# Patient Record
Sex: FEMALE | Race: WHITE | Employment: OTHER | URBAN - METROPOLITAN AREA
[De-identification: names, ages, dates, MRNs, and addresses within clinical notes are randomized per-mention and may not be internally consistent; named-entity substitution may affect disease eponyms.]

---

## 2019-08-23 ENCOUNTER — HOSPITAL ENCOUNTER (OUTPATIENT)
Dept: PHYSICAL THERAPY | Age: 74
Discharge: HOME | End: 2019-08-23

## 2019-08-23 NOTE — PROGRESS NOTES
PT NOTE: Patient cancelled her evaluation this morning (<24 hours notice), due to son not being able to drive her to appointment this morning.   Anh River, PT

## 2019-09-13 ENCOUNTER — HOSPITAL ENCOUNTER (OUTPATIENT)
Dept: PHYSICAL THERAPY | Age: 74
Discharge: HOME | End: 2019-09-13
Payer: COMMERCIAL

## 2019-09-13 PROCEDURE — 97162 PT EVAL MOD COMPLEX 30 MIN: CPT

## 2019-09-13 NOTE — THERAPY EVALUATION
Sudheer Gregg  : 1945  Primary: Radha Burn Of aCrolyne Vincent*  Secondary:  [facility] [place] [id number] [facility], [address] [place] [address],[address], [address]  [phone]   [phone]           OUTPATIENT PHYSICAL THERAPY:Initial Assessment 2019   ICD-10: Treatment Diagnosis: Other abnormalities of gait and mobility R26.89; Dizziness and giddiness R42; Muscle Weakness (generalized) M62.81  Precautions/Allergies:   Patient has no allergy information on record. Biaxin, Zinc Oxide, Erythromycin, Doxycycline, Iburprofen, Talc Powder, Bupropion, Levoflaxacin, steroids. TREATMENT PLAN:  Effective Dates: 2019 TO 2019 (90 days). Frequency/Duration: 2 times a week for 60- 90 Day(s) MEDICAL/REFERRING DIAGNOSIS:  Repeated falls [R29.6]   DATE OF ONSET: 2-3 years  REFERRING PHYSICIAN: David Valdez DO MD Orders: referral to PT   Return MD Appointment:      INITIAL ASSESSMENT:  Ms. Matt Cintron presents with decreased activity tolerance, imbalance, fall risk, and abnormal gait pattern. Patient is at risk for falls and has a history of falls. Reviewed home safety and strategies to decrease fall risk at home. . Patient would benefit from PT to address these problems to improve patient's independence and safety with mobility and daily activities. Thank you. PROBLEM LIST (Impacting functional limitations):  1. Decreased Strength  2. Decreased Transfer Abilities  3. Decreased Ambulation Ability/Technique  4. Decreased Balance  5. Decreased Activity Tolerance  6. Decreased [place] with Home Exercise Program INTERVENTIONS PLANNED: (Treatment may consist of any combination of the following)  1. Balance Exercise  2. Home Exercise Program (HEP)  3. Neuromuscular Re-education/Strengthening  4. Therapeutic Activites  5. Therapeutic Exercise/Strengthening  6.  Transfer Training        GOALS: (Goals have been discussed and agreed upon with patient.)  Short-Term Functional Goals: Time Frame: 2-4 weeks  1. Patient will demonstrate independence and compliance with home exercise program to improve balance and strength for daily activities. 2. Patient will increase her score on the Taylor Balance Scale to greater than or equal to 46/56 indicating improved safety and decreased fall risk for daily activities. 3.   Discharge Goals: Time Frame: 8-12 weeks  1. Patient will increase her score on the Taylor Balance Scale to greater than or equal to 50/56 indicating improved safety and decreased fall risk for daily activities. 2. Patient will increase her score on the dynamic gait index to greater than or equal to 21/24 indicating improved balance and safety for community ambulation. 3. Patient will ambulate with least assistive device over level and unlevel surfaces without evidence of imbalance to improve safety for daily activities. 4. Patient will demonstrate improved time on Five Time Sit to Stand Test to less than or equal to 19 seconds indicating improved LE strength for daily mobility. Patient will demonstrate improved score to less than or equal to 15 seconds on the Timed Up and Go Test indicating improved mobility for daily activities. OUTCOME MEASURE:   Tool Used: Taylor Balance Scale  Score:  Initial: 44/56 Most Recent: X/56 (Date: -- )   Interpretation of Score: Each section is scored on a 0-4 scale, 0 representing the patients inability to perform the task and 4 representing independence. The scores of each section are added together for a total score of 56. The higher the patients score, the more independent the patient is. Any score below 45 indicates increased risk for falls. Tool Used: Dynamic Gait Index  Score:  Initial: 17/24 Most Recent: X/24 (Date: -- )   Interpretation of Score: Each section is scored on a 0-3 scale, 0 representing the patients inability to perform the task and 3 representing independence.   The scores of each section are added together for a total score of 24. Any score below 19 indicates increased risk for falls. Tool Used: Five Times Sit to Stand (FTSST or 5xSST)   Score:  Initial: 23.48  Seconds from sarabjit Most Recent: X seconds (Date: -- )   Interpretation of Score: This is a measure of functional lower limb muscle strength and quantifying functional change of transitional movements. A score of 12 seconds or less indicates a normal level of function for community dwelling older individuals, a score of 15 seconds or more is at risk for fall. Tool Used: Timed Up and Go (TUG)  Score:  Initial: 16.59 seconds no AD Most Recent: X seconds (Date: -- )   Interpretation of Score: The test measures, in seconds, the time taken by an individual to stand up from a standard arm chair (seat height 46 cm [18 in], arm height 65 cm [25.6 in]), walk a distance of 3 meters (118 in, approx 10 ft), turn, walk back to the chair and sit down. If the individual takes longer than 14 seconds to complete TUG, this indicates risk for falls. MEDICAL NECESSITY:   · Patient is expected to demonstrate progress in strength, balance and functional technique to improve safety during daily activities and mobility. REASON FOR SERVICES/OTHER COMMENTS:  · Patient continues to demonstrate capacity to improve strength, balance, mobility which will increase independence and increase safety. Total Duration:  PT Patient Time In/Time Out  Time In: 1020  Time Out: 1100    Rehabilitation Potential For Stated Goals: Good  Regarding Zandra Trejo's therapy, I certify that the treatment plan above will be carried out by a therapist or under their direction.   Thank you for this referral,  Elise Medina, PT     Referring Physician Signature: Amber Loza DO _______________________________ Date _____________     PAIN/SUBJECTIVE:   Initial: Pain Intensity 1: 0 0 Post Session:  0/10   HISTORY:   History of Injury/Illness (Reason for Referral):  Had HHPT after a surgery and infection sometime in 2018, and it did help some. Has had some bad falls. Last fall was last week. Using SPC. Loses balance and falls, sometimes dizzy. Has cane for outside, but furniture walks in the house. Past Medical History/Comorbidities:   Ms. Terese Jackson  has no past medical history on file. Ms. Terese Jackson  has no past surgical history on file. hx of L TKR. 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 Incontinence and urgency problems and a lot of UTIs. Social History/Living Environment:     lives with son. 2 story, bedroom upstairs 1-2 rails on the way up. Prior Level of Function/Work/Activity:  independent  Dominant Side:         RIGHT  Previous Treatment Approaches:          Home health PT last year  Personal Factors:          Sex:  female        Age:  69 y.o. Ambulatory/Rehab Services H2 Model Falls Risk Assessment   Risk Factors:       (1)  Dizziness/Vertigo       (5)  History of Recent Falls [w/in 3 months] Ability to Rise from Chair:       (0)  Ability to rise in a single movement   Falls Prevention Plan: Mobility Assistance Device (specify):  RW for outdoors rather than cane       Exercise/Equipment Adaptation (specify):  close supervision   Total: (5 or greater = High Risk): 6   ©2010 [facility] States Patent #[id number]. Federal Law prohibits the replication, distribution or use without written permission from [facility] [employer]   Current Medications:     No current outpatient medications on file. Venlafaxine, topiramate, xarelto, osybutynin, cipro, timolol, B12, O2 2 L at night, furosemide, nystatin, loratadine, prilosec, oxytrol, tylenol, mutivitamin, hair skin nails vitamin, calcium with Vit D3, Mg, Vit D3, Probiotic, Folic Acid, Melatonin. Date Last Reviewed:  9/13/19   Number of Personal Factors/Comorbidities that affect the Plan of Care: 1-2: MODERATE COMPLEXITY   EXAMINATION:   Observation/Orthostatic Postural Assessment:           Forward head posture  Strength:          LE STRENGTH:  4/5 hip flexion, 4+/5 hip abduction, 4+/5 hip adduction, 4/5 hip extension, 4/5 knee extension, 4/5 knee flexion, 4/5 ankle dorsiflexion, 4/5 ankle plantarflexion  Functional Mobility:         Gait/Ambulation:  Patient ambulates with/without SPC at a slower pace on level surfaces with mild path deviations and increased trunk sway. Transfers:  Independent but slower        Bed Mobility:  independent        Stairs:  With rail  Sensation:         Numbness in toes. Postural Control & Balance:  · Taylor Balance Scale:  44/56.   (A score less than 45/56 indicates high risk of falls)     · Dynamic Gait Index:  17/24. (A score less than or equal to19/24 is abnormal and predictive of falls)     · Novindat Sensory Organization Test: NT       Body Structures Involved:  1. Nerves  2. Eyes and Ears  3. Muscles Body Functions Affected:  1. Sensory/Pain  2. Neuromusculoskeletal  3. Movement Related Activities and Participation Affected:  1. General Tasks and Demands  2. Mobility  3. Domestic Life  4.  Community, Social and [place]   Number of elements (examined above) that affect the Plan of Care: 3: MODERATE COMPLEXITY   CLINICAL PRESENTATION:   Presentation: Evolving clinical presentation with changing clinical characteristics: MODERATE COMPLEXITY   CLINICAL DECISION MAKING:   Use of outcome tool(s) and clinical judgement create a POC that gives a: Questionable prediction of patient's progress: MODERATE COMPLEXITY

## 2019-10-07 ENCOUNTER — HOSPITAL ENCOUNTER (OUTPATIENT)
Dept: PHYSICAL THERAPY | Age: 74
Discharge: HOME | End: 2019-10-07
Payer: COMMERCIAL

## 2019-10-11 ENCOUNTER — HOSPITAL ENCOUNTER (OUTPATIENT)
Dept: PHYSICAL THERAPY | Age: 74
Discharge: HOME | End: 2019-10-11
Payer: COMMERCIAL

## 2019-10-11 PROCEDURE — 97112 NEUROMUSCULAR REEDUCATION: CPT

## 2019-10-11 NOTE — PROGRESS NOTES
Tuyet Jean  : 1945  Primary: Simran Pichardo Of Carolyne Vincent*  Secondary:  [facility] [address], [address] [place] [address],[address], [place] [address].  [phone]   [phone]     OUTPATIENT PHYSICAL THERAPY: Daily Treatment Note 10/11/2019  Visit Count:  2    ICD-10: Treatment Diagnosis: Other abnormalities of gait and mobility R26.89; Dizziness and giddiness R42; Muscle Weakness (generalized) M62.81  Precautions/Allergies:   Patient has no allergy information on record. Biaxin, Zinc Oxide, Erythromycin, Doxycycline, Iburprofen, Talc Powder, Bupropion, Levoflaxacin, steroids. TREATMENT PLAN:  Effective Dates: 2019 TO 2019 (90 days). Frequency/Duration: 2 times a week for 60- 90 Day(s)    Pre-treatment Symptoms/Complaints:  No falls. Doing okay. Pain: Initial: Pain Intensity 1: 0  Post Session:  0/10   Medications Last Reviewed:  10/11/2019  Updated Objective Findings:  None Today  TREATMENT:     NEUROMUSCULAR RE-EDUCATION: (45 minutes):  Exercise/activities per grid below to improve balance and coordination. Required minimal verbal cues to promote dynamic balance in standing.   Activity   Date  10/11/19 Date Date Date Date Date   Activity/Exercise   Sets/reps/equipment Sets/reps/  equipment Sets/reps/  equipment Sets/reps/  equipment Sets/reps/  equipment Sets/reps/  equipment   Walking with head turns     4 laps        Walking with head up & down     4 laps        Step ups     6 inch  2x10 reps        Step taps     6 inch  2x10 reps B        Marching   4 laps        Sidestepping   2 laps        Crossovers           [place]           Walking  backwards             Tandem walking           Weaving in/out of cones     4 laps        Picking up cones             Stepping over half foam   Forward/  Lateral 2x10 reps        [employer] ball           Figure 8s            Circles right/left           Walking with 360 degree turns           Spirals Weight shifting:    Left & Right             Weight shifting: Forward & Backward      Tiltboard        Static Standing Balance     Tiltboard eyes open/ eyes closed        Standing with feet apart             Standing with feet together     Eyes closed        Standing with feet semitandem           Standing with feet tandem           Single leg stance           X1/X2 Viewing exercises             Hallpike-Enterprise testing for BPPV (Benign Paroxysmal Positional Vertigo)             Tejeda-Daroff exercises           Canalith Repositioning treatment/Epley Maneuver  for BPPV (Benign Paroxysmal Positional Vertigo)           Smart [employer] Training: See scanned report. [employer] Portal  Treatment/Session Summary:    · Response to Treatment:  Patient tolerated exercises without complaints. Patient given home exercise program consisting of balance exercises. · Communication/Consultation:  None today  · Equipment provided today:  Home exercise program  · Recommendations/Intent for next treatment session: Next visit will focus on balance exercises, strengthening exercises, and gait training.     Total Treatment Billable Duration:  45 minutes  PT Patient Time In/Time Out  Time In: 1430  Time Out: Mary 64, PT    Future Appointments   Date Time Provider Hugo Black   10/23/2019 11:20 AM Jewell Alexis MD [facility]

## 2019-10-18 ENCOUNTER — APPOINTMENT (OUTPATIENT)
Dept: PHYSICAL THERAPY | Age: 74
End: 2019-10-18
Payer: COMMERCIAL

## 2019-10-21 ENCOUNTER — APPOINTMENT (OUTPATIENT)
Dept: PHYSICAL THERAPY | Age: 74
End: 2019-10-21
Payer: COMMERCIAL

## 2019-10-23 PROBLEM — E66.01 SEVERE OBESITY (HCC): Status: ACTIVE | Noted: 2019-10-23

## 2019-10-29 ENCOUNTER — HOSPITAL ENCOUNTER (OUTPATIENT)
Dept: PHYSICAL THERAPY | Age: 74
Discharge: HOME | End: 2019-10-29
Payer: COMMERCIAL

## 2019-10-29 NOTE — PROGRESS NOTES
Harley Estrella  : 1945  Primary: Marguerite Males Of [facility]*  Secondary:  [facility] [place] [id number] [facility], [address] [place] [address],[address], [address] [facility]  [phone]   [phone]     OUTPATIENT DAILY NOTE    NAME/AGE/GENDER: Harley Estrella is a 69 y.o. female. DATE: 10/29/2019    MsDana Amanda Osmanis for today's appointment due to heart being in atrial fibrillation.     Nasrin Shoemaker PT

## 2019-11-01 ENCOUNTER — APPOINTMENT (OUTPATIENT)
Dept: PHYSICAL THERAPY | Age: 74
End: 2019-11-01

## 2019-11-05 ENCOUNTER — HOSPITAL ENCOUNTER (OUTPATIENT)
Dept: PHYSICAL THERAPY | Age: 74
Discharge: HOME | End: 2019-11-05

## 2019-11-05 NOTE — THERAPY DISCHARGE
Sulema Martinez  : 1945  Primary: [facility]  Secondary:  [facility] at [address], [address], [place] [address].  [phone]   [phone]           OUTPATIENT PHYSICAL THERAPY:Discharge Summary 2019   ICD-10: Treatment Diagnosis: Other abnormalities of gait and mobility R26.89; Dizziness and giddiness R42; Muscle Weakness (generalized) M62.81  Precautions/Allergies:   Biaxin [clarithromycin]; Bupropion; Doxycycline; Erythromycin; Ibuprofen; Levofloxacin; Other medication; Talc; and Zinc Biaxin, Zinc Oxide, Erythromycin, Doxycycline, Iburprofen, Talc Powder, Bupropion, Levoflaxacin, steroids. TREATMENT PLAN:  Effective Dates: 2019 TO 2019 (90 days). Frequency/Duration: 2 times a week for 60- 90 Day(s) MEDICAL/REFERRING DIAGNOSIS:  Repeated falls [R29.6]   DATE OF ONSET: 2-3 years  REFERRING PHYSICIAN: Luz Alexis DO MD Orders: referral to PT   Return MD Appointment:      INITIAL ASSESSMENT:  Ms. Daniel Del Toro presents with decreased activity tolerance, imbalance, fall risk, and abnormal gait pattern. Patient is at risk for falls and has a history of falls. Reviewed home safety and strategies to decrease fall risk at home. . Patient would benefit from PT to address these problems to improve patient's independence and safety with mobility and daily activities. Thank you. Discharge note:  Patient attended two scheduled physical therapy appointments from 2019 to 10/11/2019. Patient had to cancel several appointments due to atrial fibrillation. Spoke with patient today and she is still in atrial fibrillation. Patient at cardiologist and they are trying to decide the next step. Patient was told she would be discharged from physical therapy at this time and the doctor can send a new order once her atrial fibrillation has stabilized. Patient agreeable with this plan. Problems and goals unable to be reassessed. Patient discharged from physical therapy at this time. Thank you for this referral.     PROBLEM LIST (Impacting functional limitations):  1. Decreased Strength  2. Decreased Transfer Abilities  3. Decreased Ambulation Ability/Technique  4. Decreased Balance  5. Decreased Activity Tolerance  6. Decreased [place] with Home Exercise Program INTERVENTIONS PLANNED: (Treatment may consist of any combination of the following)  1. Balance Exercise  2. Home Exercise Program (HEP)  3. Neuromuscular Re-education/Strengthening  4. Therapeutic Activites  5. Therapeutic Exercise/Strengthening  6. Transfer Training        GOALS: (Goals have been discussed and agreed upon with patient.)  Short-Term Functional Goals: Time Frame: 2-4 weeks  1. Patient will demonstrate independence and compliance with home exercise program to improve balance and strength for daily activities. Goal not met. 2. Patient will increase her score on the Taylor Balance Scale to greater than or equal to 46/56 indicating improved safety and decreased fall risk for daily activities. Goal not met. 3.   Discharge Goals: Time Frame: 8-12 weeks  1. Patient will increase her score on the Taylor Balance Scale to greater than or equal to 50/56 indicating improved safety and decreased fall risk for daily activities. Goal not met. 2. Patient will increase her score on the dynamic gait index to greater than or equal to 21/24 indicating improved balance and safety for community ambulation. Goal not met. 3. Patient will ambulate with least assistive device over level and unlevel surfaces without evidence of imbalance to improve safety for daily activities. Goal not met. 4. Patient will demonstrate improved time on Five Time Sit to Stand Test to less than or equal to 19 seconds indicating improved LE strength for daily mobility. Goal not met.   Patient will demonstrate improved score to less than or equal to 15 seconds on the Timed Up and Go Test indicating improved mobility for daily activities. Goal not met. OUTCOME MEASURE:   Tool Used: Taylor Balance Scale  Score:  Initial: 44/56 Most Recent: X/56 (Date: -- )   Interpretation of Score: Each section is scored on a 0-4 scale, 0 representing the patients inability to perform the task and 4 representing independence. The scores of each section are added together for a total score of 56. The higher the patients score, the more independent the patient is. Any score below 45 indicates increased risk for falls. Tool Used: Dynamic Gait Index  Score:  Initial: 17/24 Most Recent: X/24 (Date: -- )   Interpretation of Score: Each section is scored on a 0-3 scale, 0 representing the patients inability to perform the task and 3 representing independence. The scores of each section are added together for a total score of 24. Any score below 19 indicates increased risk for falls. Tool Used: Five Times Sit to Stand (FTSST or 5xSST)   Score:  Initial: 23.48  Seconds from sarabjit Most Recent: X seconds (Date: -- )   Interpretation of Score: This is a measure of functional lower limb muscle strength and quantifying functional change of transitional movements. A score of 12 seconds or less indicates a normal level of function for community dwelling older individuals, a score of 15 seconds or more is at risk for fall. Tool Used: Timed Up and Go (TUG)  Score:  Initial: 16.59 seconds no AD Most Recent: X seconds (Date: -- )   Interpretation of Score: The test measures, in seconds, the time taken by an individual to stand up from a standard arm chair (seat height 46 cm [18 in], arm height 65 cm [25.6 in]), walk a distance of 3 meters (118 in, approx 10 ft), turn, walk back to the chair and sit down. If the individual takes longer than 14 seconds to complete TUG, this indicates risk for falls.            PAIN/SUBJECTIVE:       HISTORY:   History of Injury/Illness (Reason for Referral):  Had HHPT after a surgery and infection sometime in 2018, and it did help some. Has had some bad falls. Last fall was last week. Using SPC. Loses balance and falls, sometimes dizzy. Has cane for outside, but furniture walks in the house. Past Medical History/Comorbidities:   Ms. Iris Brown  has a past medical history of Atrial fibrillation ([address] GERD (gastroesophageal reflux disease), Glaucoma, Kidney stone, OAB (overactive bladder), Sleep apnea, and Teeth grinding. Ms. Iris Brown  has a past surgical history that includes hx tonsil and adenoidectomy; pr appendectomy; hx dilation and curettage; hx cataract removal; hx gastric bypass; hx hernia repair; hx orthopaedic; pr lap,cholecystectomy; hx joint replacement surgery; and hx hysterectomy. hx of L TKR. 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 Incontinence and urgency problems and a lot of UTIs. Social History/Living Environment:     lives with son. 2 story, bedroom upstairs 1-2 rails on the way up. Prior Level of Function/Work/Activity:  independent  Dominant Side:         RIGHT  Previous Treatment Approaches:          Home health PT last year  Personal Factors:          Sex:  female        Age:  69 y.o. Ambulatory/Rehab Services H2 Model Falls Risk Assessment   Risk Factors:       (1)  Dizziness/Vertigo       (5)  History of Recent Falls [w/in 3 months] Ability to Rise from Chair:       (0)  Ability to rise in a single movement   Falls Prevention Plan: Mobility Assistance Device (specify):  RW for outdoors rather than cane       Exercise/Equipment Adaptation (specify):  close supervision   Total: (5 or greater = High Risk): 6   ©2010 [facility] of [address] Patent #[id number]. Federal Law prohibits the replication, distribution or use without written permission from [facility] of [employer]   Current Medications:       Current Outpatient Medications:     Oxygen, , Disp: , Rfl:     furosemide (LASIX) 20 mg tablet, Take  by mouth daily. , Disp: , Rfl:    omeprazole (PRILOSEC OTC) 20 mg tablet, Take 20 mg by mouth., Disp: , Rfl:     oxybutynin (OXYTROL) 3.9 mg/24 hr transdermal patch, 1 Patch by TransDERmal route. One every 4 days, Disp: , Rfl:     acetaminophen (TYLENOL EXTRA STRENGTH PO), Take 500 mg by mouth as needed. , Disp: , Rfl:     Biotin 2,500 mcg cap, Take  by mouth., Disp: , Rfl:     cholecalciferol, vitamin D3, (VITAMIN D3) 2,000 unit tab, Take  by mouth., Disp: , Rfl:     magnesium oxide (MAG-OX) 400 mg tablet, Take 250 mg by mouth daily. , Disp: , Rfl:     B.infantis-B.ani-B.long-B.bifi (PROBIOTIC 4X) 10-15 mg TbEC, Take  by mouth daily. , Disp: , Rfl:     folic acid 582 mcg tablet, Take 800 mcg by mouth daily. , Disp: , Rfl:     melatonin 5 mg cap capsule, Take 5 mg by mouth nightly., Disp: , Rfl:     XARELTO 20 mg tab tablet, TK 1 T PO QD WF, Disp: , Rfl: 0    nystatin (MYCOSTATIN) 100,000 unit/mL suspension, , Disp: , Rfl: 0    loratadine (CLARITIN) 10 mg tablet, TK 1 T PO  D, Disp: , Rfl: 3    metoprolol tartrate (LOPRESSOR) 25 mg tablet, TK 1 T PO BID, Disp: , Rfl: 11    topiramate (TOPAMAX) 100 mg tablet, TAKE 1/2 TABLET PO QD, Disp: , Rfl: 0    venlafaxine (EFFEXOR) 100 mg tablet, TK 1 AND 1/2 TS PO BID WF, Disp: , Rfl: 0    oxybutynin (DITROPAN) 5 mg tablet, TK 1 T PO BID FOR OVERACTIVE BLADDER, Disp: , Rfl: 2    cyanocobalamin (VITAMIN B12) 1,000 mcg/mL injection, INJECT 1 ML IN THE MUSCLE TWICE MONTHLY, Disp: , Rfl: 2    estradiol (ESTRACE) 0.01 % (0.1 mg/gram) vaginal cream, Insert 1 gm into vagina at hs 2x/week, Disp: 42.5 g, Rfl: 12   Venlafaxine, topiramate, xarelto, osybutynin, cipro, timolol, B12, O2 2 L at night, furosemide, nystatin, loratadine, prilosec, oxytrol, tylenol, mutivitamin, hair skin nails vitamin, calcium with Vit D3, Mg, Vit D3, Probiotic, Folic Acid, Melatonin.        Number of Personal Factors/Comorbidities that affect the Plan of Care: 1-2: MODERATE COMPLEXITY   EXAMINATION:   Observation/Orthostatic Postural Assessment: Forward head posture  Strength:          LE STRENGTH:  4/5 hip flexion, 4+/5 hip abduction, 4+/5 hip adduction, 4/5 hip extension, 4/5 knee extension, 4/5 knee flexion, 4/5 ankle dorsiflexion, 4/5 ankle plantarflexion  Functional Mobility:         Gait/Ambulation:  Patient ambulates with/without SPC at a slower pace on level surfaces with mild path deviations and increased trunk sway. Transfers:  Independent but slower        Bed Mobility:  independent        Stairs:  With rail  Sensation:         Numbness in toes. Postural Control & Balance:  · Taylor Balance Scale:  44/56.   (A score less than 45/56 indicates high risk of falls)     · Dynamic Gait Index:  17/24. (A score less than or equal to19/24 is abnormal and predictive of falls)     · [employer] Sensory Organization Test: NT       Body Structures Involved:  1. Nerves  2. Eyes and Ears  3. Muscles Body Functions Affected:  1. Sensory/Pain  2. Neuromusculoskeletal  3. Movement Related Activities and Participation Affected:  1. General Tasks and Demands  2. Mobility  3. Domestic Life  4.  Community, Social and [place]   Number of elements (examined above) that affect the Plan of Care: 3: MODERATE COMPLEXITY   CLINICAL PRESENTATION:   Presentation: Evolving clinical presentation with changing clinical characteristics: MODERATE COMPLEXITY   CLINICAL DECISION MAKING:   Use of outcome tool(s) and clinical judgement create a POC that gives a: Questionable prediction of patient's progress: MODERATE COMPLEXITY

## 2019-11-12 ENCOUNTER — APPOINTMENT (OUTPATIENT)
Dept: PHYSICAL THERAPY | Age: 74
End: 2019-11-12

## 2020-12-23 ENCOUNTER — TRANSCRIBE ORDER (OUTPATIENT)
Dept: SCHEDULING | Age: 75
End: 2020-12-23

## 2020-12-23 DIAGNOSIS — Z12.31 SCREENING MAMMOGRAM FOR HIGH-RISK PATIENT: Primary | ICD-10-CM

## 2021-04-16 ENCOUNTER — HOSPITAL ENCOUNTER (OUTPATIENT)
Dept: MAMMOGRAPHY | Age: 76
Discharge: HOME | End: 2021-04-16
Attending: FAMILY MEDICINE
Payer: COMMERCIAL

## 2021-04-16 PROCEDURE — 77067 SCR MAMMO BI INCL CAD: CPT

## 2021-05-17 ENCOUNTER — HOSPITAL ENCOUNTER (OUTPATIENT)
Dept: PHYSICAL THERAPY | Age: 76
Discharge: HOME | End: 2021-05-17
Payer: COMMERCIAL

## 2021-05-17 PROCEDURE — 97112 NEUROMUSCULAR REEDUCATION: CPT

## 2021-05-17 PROCEDURE — 97162 PT EVAL MOD COMPLEX 30 MIN: CPT

## 2021-05-17 NOTE — THERAPY EVALUATION
Evaline Delay : 1945 Primary: [facility] Secondary:  [facility]rapy Center at [address], [address], [place] [address]. [phone]   [phone] OUTPATIENT PHYSICAL THERAPY:Initial Assessment 2021 ICD-10: Treatment Diagnosis: Difficulty in walking, not elsewhere classified (R26.2) Precautions/Allergies:  
Biaxin [clarithromycin], Bupropion, Doxycycline, Erythromycin, Ibuprofen, Levofloxacin, Other medication, Talc, and Zinc  
TREATMENT PLAN: 
Effective Dates: 2021 TO 8/15/2021 (90 days). Frequency/Duration: 1-2 times a week for 90 Day(s) MEDICAL/REFERRING DIAGNOSIS: 
Repeated falls [R29.6] DATE OF ONSET: Chronic REFERRING PHYSICIAN: Joanne Fabian DO 
MD Orders: Evaluate and treat Return MD Appointment: unknown INITIAL ASSESSMENT:  Ms. Rene Marie presents with imbalance, weakness, and difficulty walking. Patient is at higher fall risk based on history of falls and scores received on Taylor Balance Scale and Dynamic Gait Index. Patient would benefit from skilled physical therapy to address problems and goals. Thank you for this referral.     
PROBLEM LIST (Impacting functional limitations): 1. Decreased Strength 2. Decreased Ambulation Ability/Technique 3. Decreased Balance 4. Decreased [place] with Home Exercise Program INTERVENTIONS PLANNED: (Treatment may consist of any combination of the following) 1. Balance Exercise 2. Gait Training 3. Home Exercise Program (HEP) 4. Neuromuscular Re-education/Strengthening 5. Therapeutic Exercise/Strengthening GOALS: (Goals have been discussed and agreed upon with patient.) Short-Term Functional Goals: Time Frame: 30 days 1. Patient will be independent with home exercise program to improve balance. Discharge Goals: Time Frame: 90 days 1. Patient will score less than or equal to 15 seconds on TUG indicating improved gait speed.   
2. Patient will score greater than or equal to 47/56 on Taylor Balance Scale indicating improved balance and decreased fall risk with daily activities. 3. Patient will score greater than or equal to 20/24 on Dynamic Gait Index indicating improved balance and decreased fall risk with daily activities. 4. Patient will report improved mobility with activities of daily living. OUTCOME MEASURE:  
Tool Used: Taylor Balance Scale Score:  Initial: 38/56 Most Recent: X/56 (Date: -- ) Interpretation of Score: Each section is scored on a 0-4 scale, 0 representing the patients inability to perform the task and 4 representing independence. The scores of each section are added together for a total score of 56. The higher the patients score, the more independent the patient is. Any score below 45 indicates increased risk for falls. Tool Used: Dynamic Gait Index Score:  Initial: 12/24 Most Recent: X/24 (Date: -- ) Interpretation of Score: Each section is scored on a 0-3 scale, 0 representing the patients inability to perform the task and 3 representing independence. The scores of each section are added together for a total score of 24. Any score below 19 indicates increased risk for falls. Tool Used: Timed Up and Go (TUG) Score:  Initial: 20.3 seconds Most Recent: X seconds (Date: -- ) Interpretation of Score: The test measures, in seconds, the time taken by an individual to stand up from a standard arm chair (seat height 46 cm [18 in], arm height 65 cm [25.6 in]), walk a distance of 3 meters (118 in, approx 10 ft), turn, walk back to the chair and sit down. If the individual takes longer than 14 seconds to complete TUG, this indicates risk for falls. MEDICAL NECESSITY:  
· Patient is expected to demonstrate progress in strength, balance and gait to improve safety during activities of daily living.  
REASON FOR SERVICES/OTHER COMMENTS: 
· Patient continues to require skilled intervention due to higher fall risk with daily activities. Total Duration: PT Patient Time In/Time Out Time In: 3045 Time Out: 1600 Rehabilitation Potential For Stated Goals: Excellent Regarding Jose Trejo's therapy, I certify that the treatment plan above will be carried out by a therapist or under their direction. Thank you for this referral, 
Birgit Guthrie, PT Referring Physician Signature: Vida Bryantnancy, DO _______________________________ Date _____________ PAIN/SUBJECTIVE:  
Initial: Pain Intensity 1: 6 Pain Location 1: Knee Pain Orientation 1: Right  Post Session:  6/10 HISTORY:  
History of Injury/Illness (Reason for Referral): 
Patient complains of imbalance, weakness, and difficulty walking. Patient reports one fall in April. Patient reports tripping over right foot and falling. Patient uses a single point cane for ambulation. Patient admits \"furniture walking\" has gotten worse due to imbalance. Patient rates pain level in right knee as 6/10 and dizziness as 0/10. Patient has a history of a pacemaker, atrial fibrillation, and CHF. Past Medical History/Comorbidities: Ms. Remington Sethi  has a past medical history of Atrial fibrillation ([address] GERD (gastroesophageal reflux disease), Glaucoma, Kidney stone, OAB (overactive bladder), Sleep apnea, Teeth grinding, and UTI (urinary tract infection). Ms. Remington Sethi  has a past surgical history that includes hx tonsil and adenoidectomy; pr appendectomy; hx dilation and curettage; hx cataract removal; hx gastric bypass; hx hernia repair; hx orthopaedic; pr lap,cholecystectomy; hx joint replacement surgery; and hx hysterectomy. Social History/Living Environment:  
  Lives with son in a two story home. Patient lives on one floor. Prior Level of Function/Work/Activity: 
Independent. Retired. Dominant Side:  
      RIGHT Personal Factors:   
      Sex:  female Age:  78 y.o. Ambulatory/Rehab Services H2 Model Falls Risk Assessment Risk Factors: (1)  Dizziness/Vertigo 
     (5)  History of Recent Falls [w/in 3 months] Ability to Rise from Chair: 
     (1)  Pushes up, successful in one attempt Falls Prevention Plan: 
     Exercise/Equipment Adaptation (specify):  Patient needs to be supervised in the clinic at all times due to higher fall risk Total: (5 or greater = High Risk): 7  
©2010 [facility] of [place] [address] [place] [address] Patent #[id number]. Federal Law prohibits the replication, distribution or use without written permission from [facility] Current Medications:   
  
Current Outpatient Medications:  
  nystatin (MYCOSTATIN) 100,000 unit/mL suspension, Take 5 mL by mouth four (4) times daily. , Disp: 200 mL, Rfl: 2 
  Oxygen, , Disp: , Rfl:  
  furosemide (LASIX) 20 mg tablet, Take  by mouth daily. , Disp: , Rfl:  
  omeprazole (PRILOSEC OTC) 20 mg tablet, Take 20 mg by mouth., Disp: , Rfl:  
  oxybutynin (OXYTROL) 3.9 mg/24 hr transdermal patch, 1 Patch by TransDERmal route. One every 4 days, Disp: , Rfl:  
  acetaminophen (TYLENOL EXTRA STRENGTH PO), Take 500 mg by mouth as needed. , Disp: , Rfl:   Biotin 2,500 mcg cap, Take  by mouth., Disp: , Rfl:  
  cholecalciferol, vitamin D3, (VITAMIN D3) 2,000 unit tab, Take  by mouth., Disp: , Rfl:  
  magnesium oxide (MAG-OX) 400 mg tablet, Take 250 mg by mouth daily. , Disp: , Rfl:  
  B.infantis-B.ani-B.long-B.bifi (PROBIOTIC 4X) 10-15 mg TbEC, Take  by mouth daily. , Disp: , Rfl:  
  folic acid 022 mcg tablet, Take 800 mcg by mouth daily. , Disp: , Rfl:  
  melatonin 5 mg cap capsule, Take 5 mg by mouth nightly., Disp: , Rfl:  
  XARELTO 20 mg tab tablet, TK 1 T PO QD WF, Disp: , Rfl: 0 
  loratadine (CLARITIN) 10 mg tablet, TK 1 T PO  D, Disp: , Rfl: 3 
  metoprolol tartrate (LOPRESSOR) 25 mg tablet, TK 1 T PO BID, Disp: , Rfl: 11 
  topiramate (TOPAMAX) 100 mg tablet, TAKE 1/2 TABLET PO QD, Disp: , Rfl: 0 
  venlafaxine (EFFEXOR) 100 mg tablet, TK 1 AND 1/2 TS PO BID WF, Disp: , Rfl: 0 
  oxybutynin (DITROPAN) 5 mg tablet, TK 1 T PO BID FOR OVERACTIVE BLADDER, Disp: , Rfl: 2   cyanocobalamin (VITAMIN B12) 1,000 mcg/mL injection, INJECT 1 ML IN THE MUSCLE TWICE MONTHLY, Disp: , Rfl: 2 
  estradiol (ESTRACE) 0.01 % (0.1 mg/gram) vaginal cream, Insert 1 gm into vagina at hs 2x/week, Disp: 42.5 g, Rfl: 12 Date Last Reviewed:  5/17/2021 Number of Personal Factors/Comorbidities that affect the Plan of Care: 1-2: MODERATE COMPLEXITY EXAMINATION:  
Observation/Orthostatic Postural Assessment: Forward head posture. Functional Mobility:  
      Gait/Ambulation:  Patient ambulates with single point cane demonstrating slower gait speed with wider base of support. Strength:   
      Hip flexion 4-/5, hip abduction 4/5, hip adduction 4+/5, quadriceps right 4+/5 and left 4-/5, hamstrings right 4+/5 and left 4-/5, ankle dorsiflexion 4/5, ankle plantarflexion 4/5. Sensation:  
      Numbness in toes. Postural Control & Balance: · Taylor Balance Scale:  38/56.   (A score less than 45/56 indicates high risk of falls) · Dynamic Gait Index:  12/24. (A score less than or equal to19/24 is abnormal and predictive of falls) [employer] Sensory Organization Test:  Not tested. Body Structures Involved: 1. Nerves 2. Eyes and Ears 3. Muscles Body Functions Affected: 1. Neuromusculoskeletal Activities and Participation Affected: 1. General Tasks and Demands 2. Mobility 3. Community, Social and [place] Number of elements (examined above) that affect the Plan of Care: 4+: HIGH COMPLEXITY CLINICAL PRESENTATION:  
Presentation: Evolving clinical presentation with changing clinical characteristics: MODERATE COMPLEXITY CLINICAL DECISION MAKING:  
Use of outcome tool(s) and clinical judgement create a POC that gives a: Questionable prediction of patient's progress: MODERATE COMPLEXITY

## 2021-05-28 ENCOUNTER — HOSPITAL ENCOUNTER (OUTPATIENT)
Dept: PHYSICAL THERAPY | Age: 76
Discharge: HOME | End: 2021-05-28
Payer: COMMERCIAL

## 2021-05-28 PROCEDURE — 97112 NEUROMUSCULAR REEDUCATION: CPT

## 2021-05-28 NOTE — PROGRESS NOTES
Gemma Remedies  : 1945  Primary: [facility]  Secondary:  [facility]rapy [facility] at [address], [address] [place] [address],[address], [place] [address].  [phone]   [phone]     OUTPATIENT PHYSICAL THERAPY: Daily Treatment Note 2021  Visit Count:  2    ICD-10: Treatment Diagnosis: Difficulty in walking, not elsewhere classified (R26.2)  Precautions/Allergies:   Biaxin [clarithromycin], Bupropion, Doxycycline, Erythromycin, Ibuprofen, Levofloxacin, Other medication, Talc, and Zinc   TREATMENT PLAN:  Effective Dates: 2021 TO 8/15/2021 (90 days). Frequency/Duration: 1-2 times a week for 90 Day(s)    Pre-treatment Symptoms/Complaints:  \"I am not hurting as bad today\". Pain: Initial: Pain Intensity 1: 3  Pain Location 1: Back, Knee  Pain Orientation 1: Right, Lower  Post Session:  3/10   Medications Last Reviewed:  2021  Updated Objective Findings:  None Today  TREATMENT:     NEUROMUSCULAR RE-EDUCATION: (45 minutes):  Exercise/activities per grid below to improve balance and coordination. Required minimal verbal cues to promote dynamic balance in standing.     Balance/Vestibular Treatment:   Activity   Date  21 Date Date Date Date Date   Activity/Exercise   Sets/reps/equipment Sets/reps/  equipment Sets/reps/  equipment Sets/reps/  equipment Sets/reps/  equipment Sets/reps/  equipment   Walking with head turns     4 laps        Walking with head up & down             Step ups             Step taps             Marching   4 laps        Sidestepping   4 laps        Stepping over foam   Forward 2x10 reps  Lateral 2x10 reps        [place]           Walking  backwards             Tandem walking           Weaving in/out of cones     4 laps        Picking up cones     4 laps        Sports cord             [employer] ball           Figure 8s            Circles right/left           Walking with 360 degree turns           Spirals           Weight shifting:    Left & Right             Weight shifting: Forward & Backward              Static Standing Balance             Standing with feet apart     Blue foam eyes open and eyes closed        Standing with feet together             Standing with feet semitandem           Standing with feet tandem           Single leg stance           X1/X2 Viewing exercises             Hallpike-[place] testing for BPPV (Benign Paroxysmal Positional Vertigo)             Tejeda-Daroff exercises           Canalith Repositioning treatment/Epley Maneuver  for BPPV (Benign Paroxysmal Positional Vertigo)           Smart Equitest Training: See scanned report. [employer] Portal  Treatment/Session Summary:    · Response to Treatment:  Patient tolerated treatment without complaints. · Communication/Consultation:  None today  · Equipment provided today:  None today  · Recommendations/Intent for next treatment session: Next visit will focus on strengthening, gait, and balance exercises.     Total Treatment Billable Duration:  45 minutes   PT Patient Time In/Time Out  Time In: 1430  Time Out: Mary 64, PT    Future Appointments   Date Time Provider Hugo Black   6/3/2021  2:30 PM Stevie Hale PT [facility]   6/7/2021  2:30 PM Bryan Garcia PT [facility]   6/17/2021  2:30 PM Bryan Garcia PT [facility]   6/28/2021  2:30 PM Bryan Garcia PT [facility]

## 2021-06-03 ENCOUNTER — HOSPITAL ENCOUNTER (OUTPATIENT)
Dept: PHYSICAL THERAPY | Age: 76
Discharge: HOME | End: 2021-06-03

## 2021-06-03 NOTE — PROGRESS NOTES
Elzbieta Tabor  : 1945  Primary: [facility]*  Secondary:  [facility] at [place] [address],[address], [place] [address].  [phone]   [phone]     OUTPATIENT DAILY NOTE    NAME/AGE/GENDER: Elzbieta Tabor is a 78 y.o. female. DATE: 6/3/2021    Ms. Bhavin Cancino arrived to her appointment, but reports she is not feeling well. Patient reports she took a strong diuretic and lost four pounds of fluid. \"I have been extremely dizzy. I thought I could do therapy today, but I don't think I will be able to\". Patient's physical therapy appointment is canceled for today.      Mark Charles, PT

## 2021-06-07 ENCOUNTER — HOSPITAL ENCOUNTER (OUTPATIENT)
Dept: PHYSICAL THERAPY | Age: 76
Discharge: HOME | End: 2021-06-07

## 2021-06-07 NOTE — THERAPY DISCHARGE
Pratima Dudley : 1945 Primary: [facility]* Secondary:  [facility] at [place] [address] [facility], [address], [place] [address]. [phone]   [phone] OUTPATIENT PHYSICAL THERAPY:Discharge Summary 2021 ICD-10: Treatment Diagnosis: Difficulty in walking, not elsewhere classified (R26.2) Precautions/Allergies:  
Biaxin [clarithromycin], Bupropion, Doxycycline, Erythromycin, Ibuprofen, Levofloxacin, Other medication, Talc, and Zinc  
TREATMENT PLAN: 
Effective Dates: 2021 TO 8/15/2021 (90 days). Frequency/Duration: 1-2 times a week for 90 Day(s). Patient discharged from physical therapy due to her wishes. MEDICAL/REFERRING DIAGNOSIS: 
Repeated falls [R29.6] DATE OF ONSET: Chronic REFERRING PHYSICIAN: Franck Boss DO 
MD Orders: Evaluate and treat Return MD Appointment: unknown INITIAL ASSESSMENT:  Ms. Apryl Nguyen presents with imbalance, weakness, and difficulty walking. Patient is at higher fall risk based on history of falls and scores received on Taylor Balance Scale and Dynamic Gait Index. Patient would benefit from skilled physical therapy to address problems and goals. Thank you for this referral.    
Discharge note:  Patient attended two scheduled physical therapy appointments from 2021 to 2021. Patient canceled on 6/3/2021. Patient then called on 2021 to cancel her remaining appointments. Patient reports she has several doctor's appointments and she is feeling overwhelmed. Patient was told her doctor can send her back when she feels ready. Patient agreeable with this plan. Problems and goals unable to be reassessed. Patient discharged from physical therapy at this time. Thank you for this referral.    
PROBLEM LIST (Impacting functional limitations): 1. Decreased Strength 2. Decreased Ambulation Ability/Technique 3. Decreased Balance 4.  Decreased [place] with Home Exercise Program INTERVENTIONS PLANNED: (Treatment may consist of any combination of the following) 1. Balance Exercise 2. Gait Training 3. Home Exercise Program (HEP) 4. Neuromuscular Re-education/Strengthening 5. Therapeutic Exercise/Strengthening GOALS: (Goals have been discussed and agreed upon with patient.) Short-Term Functional Goals: Time Frame: 30 days 1. Patient will be independent with home exercise program to improve balance. Goal unable to be reassessed. Discharge Goals: Time Frame: 90 days 1. Patient will score less than or equal to 15 seconds on TUG indicating improved gait speed. Goal unable to be reassessed. 2. Patient will score greater than or equal to 47/56 on Taylor Balance Scale indicating improved balance and decreased fall risk with daily activities. Goal unable to be reassessed. 3. Patient will score greater than or equal to 20/24 on Dynamic Gait Index indicating improved balance and decreased fall risk with daily activities. Goal unable to be reassessed. 4. Patient will report improved mobility with activities of daily living. Goal unable to be reassessed. OUTCOME MEASURE:  
Tool Used: Taylor Balance Scale Score:  Initial: 38/56 Most Recent: X/56 (Date: -- ) Interpretation of Score: Each section is scored on a 0-4 scale, 0 representing the patients inability to perform the task and 4 representing independence. The scores of each section are added together for a total score of 56. The higher the patients score, the more independent the patient is. Any score below 45 indicates increased risk for falls. Tool Used: Dynamic Gait Index Score:  Initial: 12/24 Most Recent: X/24 (Date: -- ) Interpretation of Score: Each section is scored on a 0-3 scale, 0 representing the patients inability to perform the task and 3 representing independence. The scores of each section are added together for a total score of 24. Any score below 19 indicates increased risk for falls. Tool Used: Timed Up and Go (TUG) Score:  Initial: 20.3 seconds Most Recent: X seconds (Date: -- ) Interpretation of Score: The test measures, in seconds, the time taken by an individual to stand up from a standard arm chair (seat height 46 cm [18 in], arm height 65 cm [25.6 in]), walk a distance of 3 meters (118 in, approx 10 ft), turn, walk back to the chair and sit down. If the individual takes longer than 14 seconds to complete TUG, this indicates risk for falls. HISTORY:  
History of Injury/Illness (Reason for Referral): 
Patient complains of imbalance, weakness, and difficulty walking. Patient reports one fall in April. Patient reports tripping over right foot and falling. Patient uses a single point cane for ambulation. Patient admits \"furniture walking\" has gotten worse due to imbalance. Patient rates pain level in right knee as 6/10 and dizziness as 0/10. Patient has a history of a pacemaker, atrial fibrillation, and CHF. Past Medical History/Comorbidities: Ms. Madhuri Zamora  has a past medical history of Atrial fibrillation ([address] GERD (gastroesophageal reflux disease), Glaucoma, Kidney stone, OAB (overactive bladder), Sleep apnea, Teeth grinding, and UTI (urinary tract infection). Ms. Madhuri Zamora  has a past surgical history that includes hx tonsil and adenoidectomy; pr appendectomy; hx dilation and curettage; hx cataract removal; hx gastric bypass; hx hernia repair; hx orthopaedic; pr lap,cholecystectomy; hx joint replacement surgery; and hx hysterectomy. Social History/Living Environment:  
  Lives with son in a two story home. Patient lives on one floor. Prior Level of Function/Work/Activity: 
Independent. Retired. Dominant Side:  
      RIGHT Personal Factors:   
      Sex:  female Age:  78 y.o. Ambulatory/Rehab Services H2 Model Falls Risk Assessment Risk Factors: 
     (1)  Dizziness/Vertigo 
     (5)  History of Recent Falls [w/in 3 months] Ability to Rise from Chair: 
     (1)  Pushes up, successful in one attempt Falls Prevention Plan: 
     Exercise/Equipment Adaptation (specify):  Patient needs to be supervised in the clinic at all times due to higher fall risk Total: (5 or greater = High Risk): 7  
©2010 [facility] Patent #[id number]. Federal Law prohibits the replication, distribution or use without written permission from [facility] [employer] Current Medications:   
  
Current Outpatient Medications:  
  nystatin (MYCOSTATIN) 100,000 unit/mL suspension, Take 5 mL by mouth four (4) times daily. , Disp: 200 mL, Rfl: 2 
  Oxygen, , Disp: , Rfl:  
  furosemide (LASIX) 20 mg tablet, Take  by mouth daily. , Disp: , Rfl:  
  omeprazole (PRILOSEC OTC) 20 mg tablet, Take 20 mg by mouth., Disp: , Rfl:  
  oxybutynin (OXYTROL) 3.9 mg/24 hr transdermal patch, 1 Patch by TransDERmal route. One every 4 days, Disp: , Rfl:  
  acetaminophen (TYLENOL EXTRA STRENGTH PO), Take 500 mg by mouth as needed. , Disp: , Rfl:   Biotin 2,500 mcg cap, Take  by mouth., Disp: , Rfl:  
  cholecalciferol, vitamin D3, (VITAMIN D3) 2,000 unit tab, Take  by mouth., Disp: , Rfl:  
  magnesium oxide (MAG-OX) 400 mg tablet, Take 250 mg by mouth daily. , Disp: , Rfl:  
  B.infantis-B.ani-B.long-B.bifi (PROBIOTIC 4X) 10-15 mg TbEC, Take  by mouth daily. , Disp: , Rfl:  
  folic acid 850 mcg tablet, Take 800 mcg by mouth daily. , Disp: , Rfl:  
  melatonin 5 mg cap capsule, Take 5 mg by mouth nightly., Disp: , Rfl:  
  XARELTO 20 mg tab tablet, TK 1 T PO QD WF, Disp: , Rfl: 0 
  loratadine (CLARITIN) 10 mg tablet, TK 1 T PO  D, Disp: , Rfl: 3 
  metoprolol tartrate (LOPRESSOR) 25 mg tablet, TK 1 T PO BID, Disp: , Rfl: 11 
  topiramate (TOPAMAX) 100 mg tablet, TAKE 1/2 TABLET PO QD, Disp: , Rfl: 0 
  venlafaxine (EFFEXOR) 100 mg tablet, TK 1 AND 1/2 TS PO BID WF, Disp: , Rfl: 0 
  oxybutynin (DITROPAN) 5 mg tablet, TK 1 T PO BID FOR OVERACTIVE BLADDER, Disp: , Rfl: 2   cyanocobalamin (VITAMIN B12) 1,000 mcg/mL injection, INJECT 1 ML IN THE MUSCLE TWICE MONTHLY, Disp: , Rfl: 2 
  estradiol (ESTRACE) 0.01 % (0.1 mg/gram) vaginal cream, Insert 1 gm into vagina at hs 2x/week, Disp: 42.5 g, Rfl: 12 Number of Personal Factors/Comorbidities that affect the Plan of Care: 1-2: MODERATE COMPLEXITY EXAMINATION:  
Observation/Orthostatic Postural Assessment: Forward head posture. Functional Mobility:  
      Gait/Ambulation:  Patient ambulates with single point cane demonstrating slower gait speed with wider base of support. Strength:   
      Hip flexion 4-/5, hip abduction 4/5, hip adduction 4+/5, quadriceps right 4+/5 and left 4-/5, hamstrings right 4+/5 and left 4-/5, ankle dorsiflexion 4/5, ankle plantarflexion 4/5. Sensation:  
      Numbness in toes. Postural Control & Balance: · Taylor Balance Scale:  38/56.   (A score less than 45/56 indicates high risk of falls) · Dynamic Gait Index:  12/24. (A score less than or equal to19/24 is abnormal and predictive of falls) Smart Equitest Sensory Organization Test:  Not tested. Body Structures Involved: 1. Nerves 2. Eyes and Ears 3. Muscles Body Functions Affected: 1. Neuromusculoskeletal Activities and Participation Affected: 1. General Tasks and Demands 2. Mobility 3. Community, Social and [place] Number of elements (examined above) that affect the Plan of Care: 4+: HIGH COMPLEXITY CLINICAL PRESENTATION:  
Presentation: Evolving clinical presentation with changing clinical characteristics: MODERATE COMPLEXITY CLINICAL DECISION MAKING:  
Use of outcome tool(s) and clinical judgement create a POC that gives a: Questionable prediction of patient's progress: MODERATE COMPLEXITY

## 2021-06-17 ENCOUNTER — APPOINTMENT (OUTPATIENT)
Dept: PHYSICAL THERAPY | Age: 76
End: 2021-06-17

## 2021-06-28 ENCOUNTER — APPOINTMENT (OUTPATIENT)
Dept: PHYSICAL THERAPY | Age: 76
End: 2021-06-28

## 2022-03-18 PROBLEM — E66.01 SEVERE OBESITY (HCC): Status: ACTIVE | Noted: 2019-10-23

## 2022-03-25 ENCOUNTER — HOSPITAL ENCOUNTER (OUTPATIENT)
Dept: ULTRASOUND IMAGING | Age: 77
Discharge: HOME | End: 2022-03-25
Attending: NURSE PRACTITIONER

## 2022-03-25 DIAGNOSIS — N28.1 RENAL CYST: ICD-10-CM

## 2022-03-25 DIAGNOSIS — N20.0 KIDNEY STONES: ICD-10-CM

## 2022-03-25 DIAGNOSIS — N39.0 RECURRENT UTI: ICD-10-CM

## 2022-03-28 NOTE — PROGRESS NOTES
Renal ultrasound negative for stones. Simple cyst to left kidney-no further work up indicated for this.      Saud Bunch, NP

## 2022-05-26 DIAGNOSIS — N39.0 RECURRENT UTI: Primary | ICD-10-CM

## 2022-05-26 RX ORDER — NITROFURANTOIN MACROCRYSTALS 50 MG/1
50 CAPSULE ORAL DAILY
Qty: 90 CAPSULE | Refills: 0 | Status: SHIPPED | OUTPATIENT
Start: 2022-05-26 | End: 2022-07-29

## 2022-07-22 ENCOUNTER — HOSPITAL ENCOUNTER (OUTPATIENT)
Dept: MAMMOGRAPHY | Age: 77
Discharge: HOME | End: 2022-07-25
Payer: MEDICARE

## 2022-07-22 DIAGNOSIS — Z12.31 VISIT FOR SCREENING MAMMOGRAM: ICD-10-CM

## 2022-07-22 PROCEDURE — 77067 SCR MAMMO BI INCL CAD: CPT

## 2022-07-29 ENCOUNTER — OFFICE VISIT (OUTPATIENT)
Dept: UROLOGY | Age: 77
End: 2022-07-29
Payer: MEDICARE

## 2022-07-29 DIAGNOSIS — N81.11 MIDLINE CYSTOCELE: ICD-10-CM

## 2022-07-29 DIAGNOSIS — N39.41 URGE INCONTINENCE: ICD-10-CM

## 2022-07-29 DIAGNOSIS — N39.0 RECURRENT UTI: Primary | ICD-10-CM

## 2022-07-29 LAB
BILIRUBIN, URINE, POC: NEGATIVE
BLOOD URINE, POC: NEGATIVE
GLUCOSE URINE, POC: NEGATIVE
KETONES, URINE, POC: NEGATIVE
LEUKOCYTE ESTERASE, URINE, POC: NEGATIVE
NITRITE, URINE, POC: NEGATIVE
PH, URINE, POC: 8.5 (ref 4.6–8)
PROTEIN,URINE, POC: NEGATIVE
SPECIFIC GRAVITY, URINE, POC: 1.02 (ref 1–1.03)
URINALYSIS CLARITY, POC: ABNORMAL
URINALYSIS COLOR, POC: ABNORMAL
UROBILINOGEN, POC: ABNORMAL

## 2022-07-29 PROCEDURE — 1123F ACP DISCUSS/DSCN MKR DOCD: CPT | Performed by: NURSE PRACTITIONER

## 2022-07-29 PROCEDURE — 99214 OFFICE O/P EST MOD 30 MIN: CPT | Performed by: NURSE PRACTITIONER

## 2022-07-29 PROCEDURE — 81003 URINALYSIS AUTO W/O SCOPE: CPT | Performed by: NURSE PRACTITIONER

## 2022-07-29 RX ORDER — NITROFURANTOIN MACROCRYSTALS 50 MG/1
50 CAPSULE ORAL DAILY
Qty: 90 CAPSULE | Refills: 3 | Status: SHIPPED | OUTPATIENT
Start: 2022-07-29

## 2022-07-29 ASSESSMENT — ENCOUNTER SYMPTOMS
BACK PAIN: 0
ABDOMINAL PAIN: 0

## 2022-07-29 NOTE — PROGRESS NOTES
[facility]  [address] [facility] [address], [address] [facility]  [phone]          Irina Gagnon  : 1945    Chief Complaint   Patient presents with    Follow-up     10 weeks- OAB/ROME/UI/Cystocele          HPI     Irina Gagnon is a 78 y.o. female with hx of Afib on xarelto, pacemaker, OAB, JEFF, and CHF on torsemide being seen today for recurrent UTI. Prev followed by [facility], and Dr. Grand marshall prior to that. Pt reports recurrent UTIs beginning after a hysterectomy and \"anal hernia repair\" in . Also had sling procedure with hysterectomy. Sx include frequency, urgency, burning with urination, gross hematuria, and back pain. Has infections on monthly basis. Also has OAB. Currently taking myrbetriq 50 mg daily and states this works well for her. Continues to have occ UI, but this has greatly improved. Wears pads daily. Has documented grade 2 cystocele. Has tried pessary, but states this fell out after 3 attempts to adjust size. Not interested in surgery for this. Prescribed estrace cream, but has not been using this lately. Also reports a kidney stone, but unaware of location. Asymptomatic today. She was started on Macrodantin 50 mg suppression for recurrent E. Coli UTI in May 2022. Negative GIORGI in 2022. She has been asx since starting suppression. UA clear today. Prev PVR 0cc. Does report intermittent constipation and diarrhea. Pt from [facility], no records on file. Saw a urologist there. Reports being on cipro for 7 years and she is now resistant to this. She has failed weaning of suppression in the past. Has never had a cysto. See's Dr. Nam Cespedes with [facility] cardiology.                 Past Medical History:   Diagnosis Date    Atrial fibrillation (HCC)     GERD (gastroesophageal reflux disease)     Glaucoma     Kidney stone     OAB (overactive bladder)     Sleep apnea     Teeth grinding     UTI (urinary tract infection)      Past Surgical History:   Procedure Laterality Date    CATARACT REMOVAL      DILATION AND CURETTAGE OF UTERUS      GASTRIC BYPASS SURGERY      HERNIA REPAIR      HX JOINT REPLACEMENT SURGERY      HYSTERECTOMY (CERVIX STATUS UNKNOWN)      LAP,CHOLECYSTECTOMY      ORTHOPEDIC SURGERY      [place] APPENDECTOMY      TONSILLECTOMY AND ADENOIDECTOMY       Current Outpatient Medications   Medication Sig Dispense Refill    nitrofurantoin (MACRODANTIN) 50 MG capsule Take 1 capsule by mouth in the morning. 90 capsule 3    mirabegron (MYRBETRIQ) 50 MG TB24 Take 50 mg by mouth in the morning. 90 tablet 3    Biotin 2.5 MG CAPS Take by mouth      cyanocobalamin 1000 MCG/ML injection INJECT 1 ML IN THE MUSCLE TWICE MONTHLY      estradiol (ESTRACE) 0.1 MG/GM vaginal cream Apply 2 gms vaginally twice per week      folic acid (FOLVITE) 463 MCG tablet Take 800 mcg by mouth daily      loratadine (CLARITIN) 10 MG tablet TK 1 T PO D      magnesium oxide (MAG-OX) 400 (240 Mg) MG tablet Take 250 mg by mouth daily      metoprolol tartrate (LOPRESSOR) 25 MG tablet TK 1 T PO BID      nystatin (MYCOSTATIN) 746300 UNIT/ML suspension Take 500,000 Units by mouth 4 times daily      omeprazole (PRILOSEC OTC) 20 MG tablet Take 20 mg by mouth      rivaroxaban (XARELTO) 20 MG TABS tablet TK 1 T PO QD WF      topiramate (TOPAMAX) 100 MG tablet TAKE 1/2 TABLET PO QD      venlafaxine (EFFEXOR) 100 MG tablet TK 1 AND 1/2 TS PO BID WF       No current facility-administered medications for this visit.      Allergies   Allergen Reactions    Bupropion Other (See Comments)    Erythromycin Other (See Comments)    Ibuprofen Other (See Comments)     Swelling    Levofloxacin Other (See Comments)    Clarithromycin Rash    Doxycycline Rash    Talc Rash    Zinc Rash     Social History     Socioeconomic History    Marital status: Single     Spouse name: Not on file    Number of children: Not on file    Years of education: Not on file    Highest education level: Not on file   Occupational History    Not on file   Tobacco Use    Smoking status: Never    Smokeless tobacco: Never   Substance and Sexual Activity    Alcohol use: Never    Drug use: Never    Sexual activity: Not on file   Other Topics Concern    Not on file   Social History Narrative    Not on file     Social Determinants of Health     Financial Resource Strain: Not on file   Food Insecurity: Not on file   Transportation Needs: Not on file   Physical Activity: Not on file   Stress: Not on file   Social Connections: Not on file   Intimate Partner Violence: Not on file   Housing Stability: Not on file     No family history on file. Review of Systems  Constitutional:   Negative for fever. GI:  Negative for abdominal pain. Genitourinary: Positive for recurrent UTIs. Musculoskeletal:  Negative for back pain. Urinalysis  UA - Dipstick  Results for orders placed or performed in visit on 07/29/22   AMB POC URINALYSIS DIP STICK AUTO W/O MICRO   Result Value Ref Range    Color (UA POC)      Clarity (UA POC)      Glucose, Urine, POC Negative Negative    Bilirubin, Urine, POC Negative Negative    KETONES, Urine, POC Negative Negative    Specific Gravity, Urine, POC 1.020 1.001 - 1.035    Blood (UA POC) Negative Negative    pH, Urine, POC 8.5 (A) 4.6 - 8.0    Protein, Urine, POC Negative Negative    Urobilinogen, POC 1 mg/dL     Nitrite, Urine, POC Negative Negative    Leukocyte Esterase, Urine, POC Negative Negative       PHYSICAL EXAM    General appearance - well appearing and in no distress  Mental status - alert, oriented to person, place, and time  Neck - supple, no significant adenopathy  Chest/Lung-  Quiet, even and easy respiratory effort without use of accessory muscles  Skin - normal coloration and turgor, no rashes        Assessment and Plan    ICD-10-CM    1. Recurrent UTI  N39.0 AMB POC URINALYSIS DIP STICK AUTO W/O MICRO     nitrofurantoin (MACRODANTIN) 50 MG capsule      2.  Urge incontinence  N39.41 mirabegron (MYRBETRIQ) 50 MG TB24      3. Midline cystocele  N81.11       After discussion we have decided to continue macrodantin suppression. Risks, benefits, and alternatives reviewed. I have educated patient to behavioral changes that can decrease the frequency of any urinary infection. These include eliminating constipation, front to back wiping, frequent voiding to keep bladder volumes low, double voiding, avoid soaking in water (including baths, pools, hot tubs), avoiding tight fitting clothes, avoiding douching, use of cranberry products, and use of probiotics. I encouraged consuming minimum of 64 oz of water daily. I also recommend avoiding consumption of sugary beverages and other known bladder irritants. Continue myrbetriq. Refill sent. Avoid bladder irritants. She does not want further tx for cystocele. Will follow. ROV in 6 months. To call sooner if needed. [facility]  Dr. Koki Sullivan is supervising physician today and he approves plan of care.

## 2023-03-11 NOTE — PROGRESS NOTES
Mac Andre  : 1945  Primary: Har[facility]*  Secondary:  [facility] at [place] [id number] [facility], [address] [place] [address],[address], [place] [address].  [phone]   [phone]     OUTPATIENT PHYSICAL THERAPY: Daily Treatment Note 2021  Visit Count:  1    ICD-10: Treatment Diagnosis: Difficulty in walking, not elsewhere classified (R26.2)  Precautions/Allergies:   Biaxin [clarithromycin], Bupropion, Doxycycline, Erythromycin, Ibuprofen, Levofloxacin, Other medication, Talc, and Zinc   TREATMENT PLAN:  Effective Dates: 2021 TO 8/15/2021 (90 days). Frequency/Duration: 1-2 times a week for 90 Day(s)    Pre-treatment Symptoms/Complaints:  Imbalance, weakness, and difficulty walking  Pain: Initial: Pain Intensity 1: 6  Pain Location 1: Knee  Pain Orientation 1: Right  Post Session:  6/10   Medications Last Reviewed:  2021  Updated Objective Findings:  See evaluation note from today  TREATMENT:     NEUROMUSCULAR RE-EDUCATION: (15 minutes):  Exercise/activities per grid below to improve balance and coordination. Required minimal verbal cues to promote dynamic balance in standing. Date:  2021 Date:   Date:     Activity/Exercise Parameters Parameters Parameters   Marching in place 10 reps bilateral      Single side step 10 reps bilateral     Single step forward 10 reps bilateral                                 [employer] Portal  Treatment/Session Summary:    · Response to Treatment:  tolerated well. · Communication/Consultation:  None today  · Equipment provided today:  Home exercise program consisting of balance exercises  · Recommendations/Intent for next treatment session: Next visit will focus on strengthening, gait, and balance exercises. Total Treatment Billable Duration:  15 minutes +evaluation  PT Patient Time In/Time Out  Time In: 1515  Time Out: 1000 Tayo Lyons PT    No future appointments. Male

## 2023-06-06 ENCOUNTER — OFFICE VISIT (OUTPATIENT)
Dept: UROLOGY | Age: 78
End: 2023-06-06
Payer: MEDICARE

## 2023-06-06 DIAGNOSIS — N39.0 RECURRENT UTI: Primary | ICD-10-CM

## 2023-06-06 LAB
BILIRUBIN, URINE, POC: NORMAL
BLOOD URINE, POC: NORMAL
GLUCOSE URINE, POC: NEGATIVE
KETONES, URINE, POC: NEGATIVE
LEUKOCYTE ESTERASE, URINE, POC: NEGATIVE
NITRITE, URINE, POC: NEGATIVE
PH, URINE, POC: 5.5 (ref 4.6–8)
PROTEIN,URINE, POC: NEGATIVE
SPECIFIC GRAVITY, URINE, POC: 1.02 (ref 1–1.03)
URINALYSIS CLARITY, POC: NORMAL
URINALYSIS COLOR, POC: NORMAL
UROBILINOGEN, POC: NORMAL

## 2023-06-06 PROCEDURE — 81003 URINALYSIS AUTO W/O SCOPE: CPT | Performed by: NURSE PRACTITIONER

## 2023-06-06 NOTE — PROGRESS NOTES
UA - Dipstick  Results for orders placed or performed in visit on 06/06/23   AMB POC URINALYSIS DIP STICK AUTO W/O MICRO   Result Value Ref Range    Color (UA POC)      Clarity (UA POC)      Glucose, Urine, POC Negative Negative    Bilirubin, Urine, POC Small Negative    KETONES, Urine, POC Negative Negative    Specific Gravity, Urine, POC 1.025 1.001 - 1.035    Blood (UA POC) Trace-lysed Negative    pH, Urine, POC 5.5 4.6 - 8.0    Protein, Urine, POC Negative Negative    Urobilinogen, POC 0.2 mg/dL     Nitrite, Urine, POC Negative Negative    Leukocyte Esterase, Urine, POC Negative Negative       UA - Micro  WBC - 0  RBC - 0  Bacteria - 0  Epith - 0      Requested Prescriptions      No prescriptions requested or ordered in this encounter     Plan    Diagnosis Orders   1.  Recurrent UTI  AMB POC URINALYSIS DIP STICK AUTO W/O MICRO          Urine normal.    Sherrie Brown, APRN - CNP

## 2023-07-26 ENCOUNTER — TRANSCRIBE ORDERS (OUTPATIENT)
Dept: SCHEDULING | Age: 78
End: 2023-07-26

## 2023-07-26 DIAGNOSIS — Z12.31 SCREENING MAMMOGRAM FOR HIGH-RISK PATIENT: Primary | ICD-10-CM

## 2023-08-02 ENCOUNTER — OFFICE VISIT (OUTPATIENT)
Dept: UROLOGY | Age: 78
End: 2023-08-02
Payer: MEDICARE

## 2023-08-02 DIAGNOSIS — K59.00 CONSTIPATION, UNSPECIFIED CONSTIPATION TYPE: ICD-10-CM

## 2023-08-02 DIAGNOSIS — N39.0 RECURRENT UTI: Primary | ICD-10-CM

## 2023-08-02 DIAGNOSIS — N81.11 MIDLINE CYSTOCELE: ICD-10-CM

## 2023-08-02 DIAGNOSIS — N32.81 OVERACTIVE BLADDER: ICD-10-CM

## 2023-08-02 LAB
BILIRUBIN, URINE, POC: NEGATIVE
BLOOD URINE, POC: NORMAL
GLUCOSE URINE, POC: NEGATIVE
KETONES, URINE, POC: NORMAL
LEUKOCYTE ESTERASE, URINE, POC: NORMAL
NITRITE, URINE, POC: NEGATIVE
PH, URINE, POC: 5.5 (ref 4.6–8)
PROTEIN,URINE, POC: 100
SPECIFIC GRAVITY, URINE, POC: 1.02 (ref 1–1.03)
URINALYSIS CLARITY, POC: NORMAL
URINALYSIS COLOR, POC: NORMAL
UROBILINOGEN, POC: NORMAL

## 2023-08-02 PROCEDURE — 81003 URINALYSIS AUTO W/O SCOPE: CPT | Performed by: NURSE PRACTITIONER

## 2023-08-02 PROCEDURE — 1123F ACP DISCUSS/DSCN MKR DOCD: CPT | Performed by: NURSE PRACTITIONER

## 2023-08-02 PROCEDURE — 99214 OFFICE O/P EST MOD 30 MIN: CPT | Performed by: NURSE PRACTITIONER

## 2023-08-02 RX ORDER — CEPHALEXIN 500 MG/1
500 CAPSULE ORAL 3 TIMES DAILY
Qty: 21 CAPSULE | Refills: 0 | Status: SHIPPED | OUTPATIENT
Start: 2023-08-02 | End: 2023-08-09

## 2023-08-02 NOTE — PROGRESS NOTES
[facility]  [facility]    [facility], [address]  [facility]  [phone]          Mark Mtz  : 1945    Chief Complaint   Patient presents with    Follow-up    Urinary Tract Infection          HPI     Mark Mtz is a 70 y.o. female with hx of Afib on xarelto, pacemaker, OAB, JEFF, and CHF on torsemide referred for recurrent E. Coli UTI. Prev followed by [facility], and Dr. Georgina Richard prior to that. Pt reports recurrent UTIs beginning after a hysterectomy and \"anal hernia repair\" in . Also had sling procedure with hysterectomy. Sx include frequency, urgency, burning with urination, gross hematuria, and back pain. Macrodantin suppression 50 mg started in . She has down well w this. Negative GIORGI in . Prev PVR 0 cc via u/s. Also has OAB. Currently taking myrbetriq 50 mg daily and states this works well for her. Continues to have occ UI, but this has greatly improved. Wears pads daily. Has documented grade 2 cystocele. Has tried pessary, but states this fell out after 3 attempts to adjust size. Not interested in surgery for this. Prescribed estrace cream, but has not been using this lately. Does report intermittent constipation and diarrhea. Here today w concern for UTI. She notes 1 week of dysuria, bladder pain, UF, and urine odor. Low garde temp of 99. Has had recent worsening constipation. Taking a daily stool softener. UA suspicious for UTI.               Past Medical History:   Diagnosis Date    Atrial fibrillation (HCC)     GERD (gastroesophageal reflux disease)     Glaucoma     Kidney stone     OAB (overactive bladder)     Sleep apnea     Teeth grinding     UTI (urinary tract infection)      Past Surgical History:   Procedure Laterality Date    CATARACT REMOVAL      DILATION AND CURETTAGE OF UTERUS      GASTRIC BYPASS SURGERY      HERNIA REPAIR      HX JOINT REPLACEMENT SURGERY      HYSTERECTOMY (CERVIX STATUS UNKNOWN)

## 2023-08-04 ENCOUNTER — TELEPHONE (OUTPATIENT)
Dept: UROLOGY | Age: 78
End: 2023-08-04

## 2023-08-04 LAB
BACTERIA SPEC CULT: ABNORMAL
BACTERIA SPEC CULT: ABNORMAL
SERVICE CMNT-IMP: ABNORMAL

## 2023-08-04 NOTE — TELEPHONE ENCOUNTER
Let pt know urin infected. Keflex should clear. Take Macrodantin after. Pt let know back was hurting she wanted to make sure that was not a side effect.

## 2023-08-24 ENCOUNTER — PATIENT MESSAGE (OUTPATIENT)
Dept: UROLOGY | Age: 78
End: 2023-08-24

## 2023-08-24 DIAGNOSIS — N39.0 RECURRENT UTI: Primary | ICD-10-CM

## 2023-08-25 ENCOUNTER — TELEPHONE (OUTPATIENT)
Dept: UROLOGY | Age: 78
End: 2023-08-25

## 2023-08-25 RX ORDER — SULFAMETHOXAZOLE AND TRIMETHOPRIM 800; 160 MG/1; MG/1
1 TABLET ORAL 2 TIMES DAILY
Qty: 14 TABLET | Refills: 0 | Status: SHIPPED | OUTPATIENT
Start: 2023-08-25 | End: 2023-09-01

## 2023-08-25 NOTE — TELEPHONE ENCOUNTER
From: Hamilton Nassar  To: Jenna Ly  Sent: 8/24/2023 3:00 PM EDT  Subject: Follow-up UTI     Damienia Babinski, I'm still experiencing something from the UTI. Urgency, back pain, but most of all, I just haven't felt any better overall. I just don't have any energy and I'm tired all the time. Do you want to see me again? Or just more antibiotics? Thanks,  [email or web address]   1945  [phone]  P. S. Fridays are the best days for me to see you.

## 2023-09-08 ENCOUNTER — OFFICE VISIT (OUTPATIENT)
Dept: UROLOGY | Age: 78
End: 2023-09-08
Payer: MEDICARE

## 2023-09-08 DIAGNOSIS — N81.11 CYSTOCELE, MIDLINE: ICD-10-CM

## 2023-09-08 DIAGNOSIS — N39.41 URGE INCONTINENCE: ICD-10-CM

## 2023-09-08 DIAGNOSIS — N32.81 OVERACTIVE BLADDER: ICD-10-CM

## 2023-09-08 DIAGNOSIS — K59.00 CONSTIPATION, UNSPECIFIED CONSTIPATION TYPE: ICD-10-CM

## 2023-09-08 DIAGNOSIS — N39.0 RECURRENT UTI: Primary | ICD-10-CM

## 2023-09-08 LAB
BILIRUBIN, URINE, POC: NEGATIVE
BLOOD URINE, POC: NEGATIVE
GLUCOSE URINE, POC: NEGATIVE
KETONES, URINE, POC: NEGATIVE
LEUKOCYTE ESTERASE, URINE, POC: NEGATIVE
NITRITE, URINE, POC: NEGATIVE
PH, URINE, POC: 5.5 (ref 4.6–8)
PROTEIN,URINE, POC: NEGATIVE
SPECIFIC GRAVITY, URINE, POC: 1.02 (ref 1–1.03)
URINALYSIS CLARITY, POC: NORMAL
URINALYSIS COLOR, POC: NORMAL
UROBILINOGEN, POC: NORMAL

## 2023-09-08 PROCEDURE — 81003 URINALYSIS AUTO W/O SCOPE: CPT | Performed by: NURSE PRACTITIONER

## 2023-09-08 PROCEDURE — 99214 OFFICE O/P EST MOD 30 MIN: CPT | Performed by: NURSE PRACTITIONER

## 2023-09-08 PROCEDURE — 1123F ACP DISCUSS/DSCN MKR DOCD: CPT | Performed by: NURSE PRACTITIONER

## 2023-09-08 ASSESSMENT — ENCOUNTER SYMPTOMS: BACK PAIN: 0

## 2023-09-08 NOTE — PROGRESS NOTES
this was explained and all questions answered. She would like to proceed. Cont macrodantin for now. Cont myrbetriq. She prefers not to see Dr. Georgina Richard. Will arrange cysto w Dr. Cornel Bullock. Fu pending this. Yin Presume  Dr. Letha Lombardo is supervising physician today and he approves plan of care.

## 2023-09-29 ENCOUNTER — HOSPITAL ENCOUNTER (OUTPATIENT)
Dept: MAMMOGRAPHY | Age: 78
End: 2023-09-29
Attending: FAMILY MEDICINE
Payer: MEDICARE

## 2023-09-29 VITALS — WEIGHT: 232 LBS | HEIGHT: 63 IN | BODY MASS INDEX: 41.11 KG/M2

## 2023-09-29 PROCEDURE — 77067 SCR MAMMO BI INCL CAD: CPT

## 2023-10-06 ENCOUNTER — PROCEDURE VISIT (OUTPATIENT)
Dept: UROLOGY | Age: 78
End: 2023-10-06
Payer: MEDICARE

## 2023-10-06 DIAGNOSIS — N39.0 RECURRENT UTI: ICD-10-CM

## 2023-10-06 DIAGNOSIS — N32.81 OVERACTIVE BLADDER: Primary | ICD-10-CM

## 2023-10-06 LAB
BILIRUBIN, URINE, POC: NEGATIVE
BLOOD URINE, POC: NORMAL
GLUCOSE URINE, POC: NEGATIVE
KETONES, URINE, POC: NEGATIVE
LEUKOCYTE ESTERASE, URINE, POC: NORMAL
NITRITE, URINE, POC: POSITIVE
PH, URINE, POC: 6 (ref 4.6–8)
PROTEIN,URINE, POC: 100
SPECIFIC GRAVITY, URINE, POC: 1.03 (ref 1–1.03)
URINALYSIS CLARITY, POC: NORMAL
URINALYSIS COLOR, POC: NORMAL
UROBILINOGEN, POC: NORMAL

## 2023-10-06 PROCEDURE — 81003 URINALYSIS AUTO W/O SCOPE: CPT | Performed by: UROLOGY

## 2023-10-06 RX ORDER — NITROFURANTOIN MACROCRYSTALS 50 MG/1
50 CAPSULE ORAL DAILY
Qty: 90 CAPSULE | Refills: 3 | Status: SHIPPED | OUTPATIENT
Start: 2023-10-06

## 2023-10-06 NOTE — PROGRESS NOTES
Patient presented for cysto today but ran out of her macrobid and feels she has UTI. Cysto canceled and Urine sent for culture. Will refill macrobid for now and wait on culture results. Will start different antibiotic as dictated by culture results if indicated. Cysto will be rescheduled for 1-2 weeks    Nakul Lieberman M.D.     [facility]  [facility], [address] [place] [address]  Phone: [phone]  Fax: [phone]

## 2023-10-08 LAB
BACTERIA SPEC CULT: ABNORMAL
SERVICE CMNT-IMP: ABNORMAL

## 2023-10-09 LAB
BACTERIA SPEC CULT: ABNORMAL
BACTERIA SPEC CULT: ABNORMAL
SERVICE CMNT-IMP: ABNORMAL

## 2023-10-10 DIAGNOSIS — N39.0 RECURRENT UTI: Primary | ICD-10-CM

## 2023-10-10 RX ORDER — NITROFURANTOIN 25; 75 MG/1; MG/1
100 CAPSULE ORAL 2 TIMES DAILY
Qty: 14 CAPSULE | Refills: 0 | Status: SHIPPED | OUTPATIENT
Start: 2023-10-10 | End: 2023-10-17

## 2023-10-23 ENCOUNTER — OFFICE VISIT (OUTPATIENT)
Dept: UROLOGY | Age: 78
End: 2023-10-23
Payer: MEDICARE

## 2023-10-23 DIAGNOSIS — N39.0 RECURRENT UTI: Primary | ICD-10-CM

## 2023-10-23 LAB
BILIRUBIN, URINE, POC: NEGATIVE
BLOOD URINE, POC: NORMAL
GLUCOSE URINE, POC: NEGATIVE
KETONES, URINE, POC: NEGATIVE
LEUKOCYTE ESTERASE, URINE, POC: NORMAL
NITRITE, URINE, POC: POSITIVE
PH, URINE, POC: 7 (ref 4.6–8)
PROTEIN,URINE, POC: NEGATIVE
SPECIFIC GRAVITY, URINE, POC: 1.02 (ref 1–1.03)
URINALYSIS CLARITY, POC: NORMAL
URINALYSIS COLOR, POC: NORMAL
UROBILINOGEN, POC: NORMAL

## 2023-10-23 PROCEDURE — 81003 URINALYSIS AUTO W/O SCOPE: CPT | Performed by: UROLOGY

## 2023-10-23 RX ORDER — SULFAMETHOXAZOLE AND TRIMETHOPRIM 800; 160 MG/1; MG/1
1 TABLET ORAL 2 TIMES DAILY
Qty: 14 TABLET | Refills: 0 | Status: SHIPPED | OUTPATIENT
Start: 2023-10-23 | End: 2023-10-30

## 2023-10-23 NOTE — PROGRESS NOTES
Reason for collection: uti recheck  Patient #: [id number]  Pharmacy: [id number]          UA - Dipstick  Results for orders placed or performed in visit on 10/23/23   AMB POC URINALYSIS DIP STICK AUTO W/O MICRO   Result Value Ref Range    Color (UA POC)      Clarity (UA POC)      Glucose, Urine, POC Negative Negative    Bilirubin, Urine, POC Negative Negative    KETONES, Urine, POC Negative Negative    Specific Gravity, Urine, POC 1.020 1.001 - 1.035    Blood (UA POC) Trace Negative    pH, Urine, POC 7.0 4.6 - 8.0    Protein, Urine, POC Negative Negative    Urobilinogen, POC 1 mg/dL     Nitrite, Urine, POC Positive Negative    Leukocyte Esterase, Urine, POC Small Negative       UA - Micro  WBC - 10-20  RBC - 10-20  Bacteria - Present  Epith - +2      Requested Prescriptions     Signed Prescriptions Disp Refills    sulfamethoxazole-trimethoprim (BACTRIM DS;SEPTRA DS) 800-160 MG per tablet 14 tablet 0     Sig: Take 1 tablet by mouth 2 times daily for 7 days     Plan    Diagnosis Orders   1. Recurrent UTI  AMB POC URINALYSIS DIP STICK AUTO W/O MICRO    Culture, Urine    Culture, Urine    sulfamethoxazole-trimethoprim (BACTRIM DS;SEPTRA DS) 800-160 MG per tablet        U/A suspicious for UTI. Sent for culture. Empiric bactrim started based on last culture. Will call with results and adjust as indicated. Keep cysto date next week and will keep on antibiotic until cysto. Nakul Lynch M.D.     [facility]  [address]  Phone: [phone]  Fax: [phone]

## 2023-10-26 LAB
BACTERIA SPEC CULT: ABNORMAL
BACTERIA SPEC CULT: ABNORMAL
SERVICE CMNT-IMP: ABNORMAL

## 2023-10-26 NOTE — RESULT ENCOUNTER NOTE
Mrs. Ghazala Zheng, your urine culture showed a UTI. You are on the correct antibiotic bactrim. Please continue to take that as prescribed until your appointment with me next week.    Charlotte Lujan,  Dr. Doug Mejia

## 2023-10-30 ENCOUNTER — PROCEDURE VISIT (OUTPATIENT)
Dept: UROLOGY | Age: 78
End: 2023-10-30
Payer: MEDICARE

## 2023-10-30 DIAGNOSIS — N35.028 OTHER POST-TRAUMATIC STRICTURE OF FEMALE URETHRA: ICD-10-CM

## 2023-10-30 DIAGNOSIS — N39.0 RECURRENT UTI: ICD-10-CM

## 2023-10-30 DIAGNOSIS — N39.41 URGE INCONTINENCE: ICD-10-CM

## 2023-10-30 DIAGNOSIS — N81.11 MIDLINE CYSTOCELE: ICD-10-CM

## 2023-10-30 DIAGNOSIS — N32.81 OVERACTIVE BLADDER: Primary | ICD-10-CM

## 2023-10-30 LAB
BILIRUBIN, URINE, POC: NORMAL
BLOOD URINE, POC: NEGATIVE
GLUCOSE URINE, POC: NEGATIVE
KETONES, URINE, POC: NEGATIVE
LEUKOCYTE ESTERASE, URINE, POC: NEGATIVE
NITRITE, URINE, POC: NEGATIVE
PH, URINE, POC: 6.5 (ref 4.6–8)
PROTEIN,URINE, POC: 30
SPECIFIC GRAVITY, URINE, POC: 1.03 (ref 1–1.03)
URINALYSIS CLARITY, POC: NORMAL
URINALYSIS COLOR, POC: NORMAL
UROBILINOGEN, POC: NORMAL

## 2023-10-30 PROCEDURE — 1123F ACP DISCUSS/DSCN MKR DOCD: CPT | Performed by: UROLOGY

## 2023-10-30 PROCEDURE — 52000 CYSTOURETHROSCOPY: CPT | Performed by: UROLOGY

## 2023-10-30 PROCEDURE — 99214 OFFICE O/P EST MOD 30 MIN: CPT | Performed by: UROLOGY

## 2023-10-30 PROCEDURE — 81003 URINALYSIS AUTO W/O SCOPE: CPT | Performed by: UROLOGY

## 2023-10-30 RX ORDER — OMEPRAZOLE 40 MG/1
40 CAPSULE, DELAYED RELEASE ORAL 2 TIMES DAILY
COMMUNITY
Start: 2023-10-24

## 2023-10-30 RX ORDER — DONEPEZIL HYDROCHLORIDE 10 MG/1
10 TABLET, FILM COATED ORAL
COMMUNITY
Start: 2023-10-24

## 2023-10-30 RX ORDER — TOPIRAMATE 50 MG/1
50 TABLET, FILM COATED ORAL DAILY
COMMUNITY
Start: 2023-10-12

## 2023-10-30 NOTE — PROGRESS NOTES
Not on file    Highest education level: Not on file   Occupational History    Not on file   Tobacco Use    Smoking status: Never    Smokeless tobacco: Never   Substance and Sexual Activity    Alcohol use: Never    Drug use: Never    Sexual activity: Not on file   Other Topics Concern    Not on file   Social History Narrative    Not on file     Social Determinants of Health     Financial Resource Strain: Not on file   Food Insecurity: Not on file   Transportation Needs: Not on file   Physical Activity: Not on file   Stress: Not on file   Social Connections: Not on file   Intimate Partner Violence: Not on file   Housing Stability: Not on file     History reviewed. No pertinent family history. There were no vitals taken for this visit. UA - Dipstick  Results for orders placed or performed in visit on 10/30/23   AMB POC URINALYSIS DIP STICK AUTO W/O MICRO   Result Value Ref Range    Color (UA POC)      Clarity (UA POC)      Glucose, Urine, POC Negative Negative    Bilirubin, Urine, POC Small Negative    KETONES, Urine, POC Negative Negative    Specific Gravity, Urine, POC 1.030 1.001 - 1.035    Blood (UA POC) Negative Negative    pH, Urine, POC 6.5 4.6 - 8.0    Protein, Urine, POC 30 Negative    Urobilinogen, POC 1 mg/dL     Nitrite, Urine, POC Negative Negative    Leukocyte Esterase, Urine, POC Negative Negative       UA - Micro  WBC - 0  RBC - 0  Bacteria - 0  Epith - 0    There were no vitals taken for this visit.      GENERAL: No acute distress, Awake, Alert, Oriented X 3, Gait normal  CARDIAC: regular rate and rhythm  CHEST AND LUNG: Easy work of breathing, clear to auscultation bilaterally, no cyanosis  ABDOMEN: soft, non tender, non-distended, positive bowel sounds, no organomegaly, no palpable masses, no guarding, no rebound tenderness  Pelvic: Grade 2 cystocele noted  SKIN: No rash, no erythema, no lacerations or abrasions, no ecchymosis  NEUROLOGIC: cranial nerves 2-12 grossly intact       Cystoscopy

## 2023-11-20 ENCOUNTER — OFFICE VISIT (OUTPATIENT)
Dept: UROLOGY | Age: 78
End: 2023-11-20
Payer: MEDICARE

## 2023-11-20 DIAGNOSIS — N35.028 OTHER POST-TRAUMATIC STRICTURE OF FEMALE URETHRA: ICD-10-CM

## 2023-11-20 DIAGNOSIS — N39.0 RECURRENT UTI: ICD-10-CM

## 2023-11-20 DIAGNOSIS — N81.11 MIDLINE CYSTOCELE: ICD-10-CM

## 2023-11-20 DIAGNOSIS — N32.81 OVERACTIVE BLADDER: ICD-10-CM

## 2023-11-20 DIAGNOSIS — N39.0 RECURRENT UTI: Primary | ICD-10-CM

## 2023-11-20 LAB
BILIRUBIN, URINE, POC: NEGATIVE
BLOOD URINE, POC: NEGATIVE
GLUCOSE URINE, POC: NEGATIVE
KETONES, URINE, POC: NEGATIVE
LEUKOCYTE ESTERASE, URINE, POC: NEGATIVE
NITRITE, URINE, POC: NEGATIVE
PH, URINE, POC: 7 (ref 4.6–8)
PROTEIN,URINE, POC: NEGATIVE
SPECIFIC GRAVITY, URINE, POC: 1.02 (ref 1–1.03)
URINALYSIS CLARITY, POC: NORMAL
URINALYSIS COLOR, POC: NORMAL
UROBILINOGEN, POC: NORMAL

## 2023-11-20 PROCEDURE — 81003 URINALYSIS AUTO W/O SCOPE: CPT | Performed by: NURSE PRACTITIONER

## 2023-11-20 PROCEDURE — 99214 OFFICE O/P EST MOD 30 MIN: CPT | Performed by: NURSE PRACTITIONER

## 2023-11-20 PROCEDURE — 1123F ACP DISCUSS/DSCN MKR DOCD: CPT | Performed by: NURSE PRACTITIONER

## 2023-11-20 RX ORDER — SULFAMETHOXAZOLE AND TRIMETHOPRIM 800; 160 MG/1; MG/1
1 TABLET ORAL 2 TIMES DAILY
Qty: 14 TABLET | Refills: 0 | Status: SHIPPED | OUTPATIENT
Start: 2023-11-20 | End: 2023-11-27

## 2023-11-20 ASSESSMENT — ENCOUNTER SYMPTOMS: BACK PAIN: 0

## 2023-11-20 NOTE — PROGRESS NOTES
Plan    ICD-10-CM    1. Recurrent UTI  N39.0 AMB POC URINALYSIS DIP STICK AUTO W/O MICRO     Culture, Urine     sulfamethoxazole-trimethoprim (BACTRIM DS;SEPTRA DS) 800-160 MG per tablet      2. 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 Midline cystocele  N81.11 AMB POC URINALYSIS DIP STICK AUTO W/O MICRO      4. Overactive bladder  N32.81 AMB POC URINALYSIS DIP STICK AUTO W/O MICRO      Send culture to ensure no infection. She will be called w results. Cont daily macrodantin. I have provide pt w PRN bactrim due to upcoming travel. UTI prevention discussed. Encouraged compliance w estrace. Cont myrbetriq. Advised to avoid bladder irritants. Fu pending urine culture. If this Is negative, will see back in 6 months. Ricky Charlton is supervising physician today and he approves plan of care.

## 2023-11-22 LAB
BACTERIA SPEC CULT: NORMAL
BACTERIA SPEC CULT: NORMAL
SERVICE CMNT-IMP: NORMAL

## 2024-01-11 DIAGNOSIS — N39.41 URGE INCONTINENCE: ICD-10-CM

## 2024-01-12 DIAGNOSIS — N39.41 URGE INCONTINENCE: ICD-10-CM

## 2024-01-12 RX ORDER — MIRABEGRON 50 MG/1
50 TABLET, FILM COATED, EXTENDED RELEASE ORAL DAILY
Qty: 90 TABLET | Refills: 3 | OUTPATIENT
Start: 2024-01-12

## 2024-01-12 NOTE — TELEPHONE ENCOUNTER
From: Petra Head  To: Office of Edilia Dey  Sent: 1/11/2024 5:06 PM EST  Subject: Medication Renewal Request    Refills have been requested for the following medications:     mirabegron (MYRBETRIQ) 50 MG TB24 [Edilia Dey, APRN - CNP]    Preferred pharmacy: [id number] [address] - P [phone] - F [phone]

## 2024-02-14 ENCOUNTER — APPOINTMENT (OUTPATIENT)
Dept: GENERAL RADIOLOGY | Age: 79
End: 2024-02-14
Payer: MEDICARE

## 2024-02-14 ENCOUNTER — HOSPITAL ENCOUNTER (EMERGENCY)
Age: 79
Discharge: HOME | End: 2024-02-14
Attending: EMERGENCY MEDICINE
Payer: MEDICARE

## 2024-02-14 VITALS
TEMPERATURE: 97.9 F | HEIGHT: 63 IN | BODY MASS INDEX: 42.17 KG/M2 | WEIGHT: 238 LBS | DIASTOLIC BLOOD PRESSURE: 57 MMHG | HEART RATE: 70 BPM | OXYGEN SATURATION: 95 % | SYSTOLIC BLOOD PRESSURE: 125 MMHG | RESPIRATION RATE: 17 BRPM

## 2024-02-14 DIAGNOSIS — J20.9 ACUTE BRONCHITIS, UNSPECIFIED ORGANISM: Primary | ICD-10-CM

## 2024-02-14 DIAGNOSIS — I50.9 ACUTE ON CHRONIC CONGESTIVE HEART FAILURE, UNSPECIFIED HEART FAILURE TYPE (HCC): ICD-10-CM

## 2024-02-14 PROBLEM — Z98.890 HX OF ATRIOVENTRICULAR NODE ABLATION: Status: ACTIVE | Noted: 2024-02-14

## 2024-02-14 PROBLEM — I42.8 NONISCHEMIC CARDIOMYOPATHY (HCC): Status: ACTIVE | Noted: 2024-02-14

## 2024-02-14 PROBLEM — I48.21 PERMANENT ATRIAL FIBRILLATION (HCC): Status: ACTIVE | Noted: 2024-02-14

## 2024-02-14 LAB
ALBUMIN SERPL-MCNC: 3.9 G/DL (ref 3.2–4.6)
ALBUMIN/GLOB SERPL: 1.2 (ref 0.4–1.6)
ALP SERPL-CCNC: 105 U/L (ref 45–117)
ALT SERPL-CCNC: 18 U/L (ref 13–61)
ANION GAP SERPL CALC-SCNC: 8 MMOL/L (ref 2–11)
AST SERPL-CCNC: 30 U/L (ref 15–37)
BASOPHILS # BLD: 0.1 K/UL (ref 0–0.2)
BASOPHILS NFR BLD: 1 % (ref 0–2)
BILIRUB SERPL-MCNC: 0.4 MG/DL (ref 0.2–1.1)
BUN SERPL-MCNC: 28 MG/DL (ref 8–23)
CALCIUM SERPL-MCNC: 9 MG/DL (ref 8.3–10.4)
CHLORIDE SERPL-SCNC: 107 MMOL/L (ref 98–107)
CO2 SERPL-SCNC: 27 MMOL/L (ref 21–32)
CREAT SERPL-MCNC: 0.78 MG/DL (ref 0.6–1)
DIFFERENTIAL METHOD BLD: ABNORMAL
EKG ATRIAL RATE: 106 BPM
EKG DIAGNOSIS: NORMAL
EKG P AXIS: 0 DEGREES
EKG P-R INTERVAL: 167 MS
EKG Q-T INTERVAL: 417 MS
EKG QRS DURATION: 146 MS
EKG QTC CALCULATION (BAZETT): 472 MS
EKG R AXIS: 257 DEGREES
EKG T AXIS: 70 DEGREES
EKG VENTRICULAR RATE: 77 BPM
EOSINOPHIL # BLD: 0.1 K/UL (ref 0–0.8)
EOSINOPHIL NFR BLD: 2 % (ref 0.5–7.8)
ERYTHROCYTE [DISTWIDTH] IN BLOOD BY AUTOMATED COUNT: 14 % (ref 11.9–14.6)
FLUAV RNA SPEC QL NAA+PROBE: NOT DETECTED
FLUBV RNA SPEC QL NAA+PROBE: NOT DETECTED
GLOBULIN SER CALC-MCNC: 3.2 G/DL (ref 2.8–4.5)
GLUCOSE SERPL-MCNC: 108 MG/DL (ref 65–100)
HCT VFR BLD AUTO: 46.1 % (ref 35.8–46.3)
HGB BLD-MCNC: 14.9 G/DL (ref 11.7–15.4)
IMM GRANULOCYTES # BLD AUTO: 0 K/UL (ref 0–0.5)
IMM GRANULOCYTES NFR BLD AUTO: 0 % (ref 0–5)
INR PPP: 1.1
LYMPHOCYTES # BLD: 1.9 K/UL (ref 0.5–4.6)
LYMPHOCYTES NFR BLD: 29 % (ref 13–44)
MCH RBC QN AUTO: 33.1 PG (ref 26.1–32.9)
MCHC RBC AUTO-ENTMCNC: 32.3 G/DL (ref 31.4–35)
MCV RBC AUTO: 102.4 FL (ref 82–102)
MONOCYTES # BLD: 0.5 K/UL (ref 0.1–1.3)
MONOCYTES NFR BLD: 8 % (ref 4–12)
NEUTS SEG # BLD: 4.1 K/UL (ref 1.7–8.2)
NEUTS SEG NFR BLD: 60 % (ref 43–78)
NRBC # BLD: 0 K/UL (ref 0–0.2)
NT PRO BNP: 1029 PG/ML (ref 0–450)
PLATELET # BLD AUTO: 268 K/UL (ref 150–450)
PMV BLD AUTO: 8.6 FL (ref 9.4–12.3)
POTASSIUM SERPL-SCNC: 4.5 MMOL/L (ref 3.5–5.1)
PROCALCITONIN SERPL-MCNC: 0.05 NG/ML (ref 0–0.49)
PROT SERPL-MCNC: 7.1 G/DL (ref 6.4–8.2)
PROTHROMBIN TIME: 14.5 SEC (ref 11.3–14.9)
RBC # BLD AUTO: 4.5 M/UL (ref 4.05–5.2)
SARS-COV-2 RDRP RESP QL NAA+PROBE: NOT DETECTED
SODIUM SERPL-SCNC: 142 MMOL/L (ref 133–143)
SOURCE: NORMAL
WBC # BLD AUTO: 6.7 K/UL (ref 4.3–11.1)

## 2024-02-14 PROCEDURE — 99285 EMERGENCY DEPT VISIT HI MDM: CPT

## 2024-02-14 PROCEDURE — 87635 SARS-COV-2 COVID-19 AMP PRB: CPT

## 2024-02-14 PROCEDURE — 87502 INFLUENZA DNA AMP PROBE: CPT

## 2024-02-14 PROCEDURE — 85025 COMPLETE CBC W/AUTO DIFF WBC: CPT

## 2024-02-14 PROCEDURE — 85610 PROTHROMBIN TIME: CPT

## 2024-02-14 PROCEDURE — 84145 PROCALCITONIN (PCT): CPT

## 2024-02-14 PROCEDURE — 93010 ELECTROCARDIOGRAM REPORT: CPT | Performed by: INTERNAL MEDICINE

## 2024-02-14 PROCEDURE — 93005 ELECTROCARDIOGRAM TRACING: CPT | Performed by: EMERGENCY MEDICINE

## 2024-02-14 PROCEDURE — 80053 COMPREHEN METABOLIC PANEL: CPT

## 2024-02-14 PROCEDURE — 71046 X-RAY EXAM CHEST 2 VIEWS: CPT

## 2024-02-14 PROCEDURE — 83880 ASSAY OF NATRIURETIC PEPTIDE: CPT

## 2024-02-14 RX ORDER — AMOXICILLIN AND CLAVULANATE POTASSIUM 875; 125 MG/1; MG/1
1 TABLET, FILM COATED ORAL 2 TIMES DAILY
Qty: 14 TABLET | Refills: 0 | Status: SHIPPED | OUTPATIENT
Start: 2024-02-14 | End: 2024-02-24

## 2024-02-14 RX ORDER — TORSEMIDE 20 MG/1
20 TABLET ORAL 2 TIMES DAILY
Qty: 10 TABLET | Refills: 0 | Status: SHIPPED | OUTPATIENT
Start: 2024-02-14 | End: 2024-02-16 | Stop reason: CLARIF

## 2024-02-14 NOTE — ED PROVIDER NOTES
Emergency Department Provider Note       PCP: Lombardi, Milena, DO   Age: 78 y.o.   Sex: female     DISPOSITION Decision To Discharge 02/14/2024 06:07:07 PM       ICD-10-CM    1. Acute bronchitis, unspecified organism  J20.9       2. Acute on chronic congestive heart failure, unspecified heart failure type (HCC)  I50.9           Medical Decision Making     Complexity of Problems Addressed:  1 or more chronic illnesses with a severe exacerbation or progression.  1 or more acute illnesses that pose a threat to life or bodily function.     Data Reviewed and Analyzed:  I independently ordered and reviewed each unique test.  I reviewed external records: ED visit note from an outside group.  I reviewed external records: provider visit note from PCP.  I reviewed external records: previous lab results from outside ED.  I reviewed external records: previous imaging study including radiologist interpretation.       I independently interpreted the cardiac monitor rhythm strip normal sinus rhythm.  I interpreted the X-rays mild cardiomegaly, no focal infiltrate.  EKG Interpretation, independent of cardiologist: Normal sinus rhythm, rate of 77, normal axis, right bundle branch block, no new gross ST segment elevation or depression noted    Discussion of management or test interpretation.  Some rhonchi noted on exam without any hypoxia.  Will swab for COVID and the flu.  Obtain chest x-ray.  Obtain basic labs as well    6:09 PM EST  Laboratory data here is reassuring.  She does have a mildly elevated BNP.  Chest x-ray shows no gross focal infiltrates and she is negative for COVID and flu.  She nontoxic here.  She does have a history of heart failure.  She had been prescribed torsemide in the past.  Will have her restart this for the next couple days.  I will place on antibiotics for bronchitis as well.            Risk of Complications and/or Morbidity of Patient Management:  Over the counter drug management

## 2024-02-14 NOTE — DISCHARGE INSTRUCTIONS
Take medications as prescribed  Follow-up with your primary care physician as well as your cardiologist  Return to the ER for any new, worsening or life-threatening symptoms

## 2024-02-14 NOTE — ED TRIAGE NOTES
Pt reports that she started coughing and tasted metal and felt her jaw started hurting, reports started coughing up phlegm and also reports some shob.  Has hx of gastric bypass and was told to come in if she tasted metal.  Denies cp.

## 2024-02-14 NOTE — PROGRESS NOTES
[facility]  [address] [place] [address], [address]  [place] [address]  PHONE: [phone]      24    NAME:  Petra Head  : 1945  MRN: [id number]         SUBJECTIVE:   Petra Head is a 78 y.o. female seen for a consultation visit regarding the following:     Chief Complaint   Patient presents with    New Patient    Atrial Fibrillation            HPI:  Consultation is requested by Lombardi, Milena, DO for evaluation of New Patient and Atrial Fibrillation   .    Consult to establish cardiology care in  system d/t insurance issues.  HFmrEF with normalization in LV systolic function, atrial fibrillation s/p AV node ablation, status post BiV pacemaker implantation, obesity with history of gastric bypass surgery, chronic lymphedema, angiographically normal coronary arteries.  Her device was last checked 2023, normal function and no events    She was seen in the ER here 2 days ago with some dyspnea.  CXR was clear but NT-pro BNP was > 1000.  She doesn't take her diuretic regularly because of her frequent urination.  Elmer states her whole personality changes when she takes it, she admits its because of the frequent urination.  ER prescribed 20 mg BID for 5 days, but she is taking 60 mg at one time the last 2 days, this is what her prior cardiologist  had prescribed.  It had been about 10 days, maybe more, since she had taken the diuretic prior to the ER visit. Her cough is better since Wednesday but still short of breath, denies pedal edema.  Weighs daily.  Has lost 5 lbs since 2 days ago, had gained ~ 10 prior.      JEFF - not wearing CPAP because she read her mask could interact with her pacemaker and cause death. She was originally diagnosed at [facility] some years ago, hasn't seen them in more than 2 years. Admits she is sedentary, sits most of the day, and understood a previous provider to have told her not to join the [facility] ???          Here with her son, Elmer.

## 2024-02-16 ENCOUNTER — INITIAL CONSULT (OUTPATIENT)
Age: 79
End: 2024-02-16
Payer: MEDICARE

## 2024-02-16 VITALS
WEIGHT: 238 LBS | HEIGHT: 63 IN | DIASTOLIC BLOOD PRESSURE: 70 MMHG | HEART RATE: 80 BPM | BODY MASS INDEX: 42.17 KG/M2 | SYSTOLIC BLOOD PRESSURE: 110 MMHG

## 2024-02-16 DIAGNOSIS — Z95.0 STATUS POST BIVENTRICULAR PACEMAKER: ICD-10-CM

## 2024-02-16 DIAGNOSIS — G47.33 OBSTRUCTIVE SLEEP APNEA SYNDROME: ICD-10-CM

## 2024-02-16 DIAGNOSIS — Z98.890 HX OF ATRIOVENTRICULAR NODE ABLATION: ICD-10-CM

## 2024-02-16 DIAGNOSIS — R06.02 SHORTNESS OF BREATH: ICD-10-CM

## 2024-02-16 DIAGNOSIS — I42.8 NONISCHEMIC CARDIOMYOPATHY (HCC): Primary | ICD-10-CM

## 2024-02-16 DIAGNOSIS — I48.21 PERMANENT ATRIAL FIBRILLATION (HCC): ICD-10-CM

## 2024-02-16 PROCEDURE — 99204 OFFICE O/P NEW MOD 45 MIN: CPT | Performed by: INTERNAL MEDICINE

## 2024-02-16 PROCEDURE — 1123F ACP DISCUSS/DSCN MKR DOCD: CPT | Performed by: INTERNAL MEDICINE

## 2024-02-16 RX ORDER — TORSEMIDE 20 MG/1
20 TABLET ORAL DAILY
Qty: 30 TABLET | COMMUNITY
Start: 2024-02-16

## 2024-02-16 RX ORDER — TORSEMIDE 20 MG/1
60 TABLET ORAL DAILY
COMMUNITY
End: 2024-02-16

## 2024-02-16 RX ORDER — ALENDRONATE SODIUM 70 MG/1
70 TABLET ORAL
COMMUNITY
Start: 2023-12-07

## 2024-03-08 ENCOUNTER — NURSE ONLY (OUTPATIENT)
Age: 79
End: 2024-03-08

## 2024-03-08 DIAGNOSIS — I42.8 NONISCHEMIC CARDIOMYOPATHY (HCC): Primary | ICD-10-CM

## 2024-04-01 ENCOUNTER — TELEPHONE (OUTPATIENT)
Dept: SLEEP MEDICINE | Age: 79
End: 2024-04-01

## 2024-04-05 PROBLEM — Z95.0 CARDIAC RESYNCHRONIZATION THERAPY PACEMAKER (CRT-P) IN PLACE: Status: ACTIVE | Noted: 2024-04-05

## 2024-04-05 PROBLEM — I50.32 CHRONIC HEART FAILURE WITH PRESERVED EJECTION FRACTION (HFPEF) (HCC): Status: ACTIVE | Noted: 2024-04-05

## 2024-04-05 PROBLEM — R73.03 PREDIABETES: Status: ACTIVE | Noted: 2024-04-05

## 2024-04-05 PROBLEM — G47.33 OBSTRUCTIVE SLEEP APNEA: Status: ACTIVE | Noted: 2024-04-05

## 2024-04-10 PROBLEM — I50.32 CHRONIC HEART FAILURE WITH PRESERVED EJECTION FRACTION (HFPEF) (HCC): Status: RESOLVED | Noted: 2024-04-05 | Resolved: 2024-04-10

## 2024-04-10 NOTE — PROGRESS NOTES
Father     Stroke Sister     Arrhythmia Sister     Diabetes Sister      Social History     Tobacco Use    Smoking status: Never    Smokeless tobacco: Never   Substance Use Topics    Alcohol use: Yes     Comment: rare       ROS:    ROS       PHYSICAL EXAM:   /84   Pulse 93   Ht 1.6 m (5' 3\")   Wt 107.5 kg (237 lb)   BMI 41.98 kg/m²      Wt Readings from Last 3 Encounters:   04/12/24 107.5 kg (237 lb)   03/15/24 108 kg (238 lb)   02/16/24 108 kg (238 lb)     BP Readings from Last 3 Encounters:   04/12/24 120/84   03/15/24 116/80   02/16/24 110/70     Pulse Readings from Last 3 Encounters:   04/12/24 93   02/16/24 80   02/14/24 70           Physical Exam    Medical problems and test results were reviewed with the patient today.     DATA REVIEW      Dec 2023        TG 99 HDL 38 LDL 64 LDL/HDL 1.7        A1C 6.1%    BMP  Lab Results   Component Value Date/Time     02/14/2024 04:37 PM    K 4.5 02/14/2024 04:37 PM     02/14/2024 04:37 PM    CO2 27 02/14/2024 04:37 PM    BUN 28 02/14/2024 04:37 PM    CREATININE 0.78 02/14/2024 04:37 PM    GLUCOSE 108 02/14/2024 04:37 PM    CALCIUM 9.0 02/14/2024 04:37 PM          EKG        CXR/IMAGING        DEVICE INTERROGATION        OUTSIDE RECORDS REVIEW    Records from outside providers have been reviewed and summarized as noted in the HPI, past history and data review sections of this note, and reviewed with patient. .       ASSESSMENT and PLAN    Petra was seen today for follow-up and congestive heart failure.    Diagnoses and all orders for this visit:    Permanent atrial fibrillation (HCC)    Nonischemic cardiomyopathy (HCC)    Cardiac resynchronization therapy pacemaker (CRT-P) in place    Other orders  -     magnesium oxide (MAG-OX) 400 (240 Mg) MG tablet; Take 1 tablet by mouth daily  -     potassium chloride (K-TAB) 20 MEQ TBCR extended release tablet; Take 1 tablet by mouth daily  -     rivaroxaban (XARELTO) 20 MG TABS tablet; Take 1 tablet by

## 2024-04-12 ENCOUNTER — OFFICE VISIT (OUTPATIENT)
Age: 79
End: 2024-04-12
Payer: MEDICARE

## 2024-04-12 VITALS
DIASTOLIC BLOOD PRESSURE: 84 MMHG | HEART RATE: 93 BPM | HEIGHT: 63 IN | BODY MASS INDEX: 41.99 KG/M2 | SYSTOLIC BLOOD PRESSURE: 120 MMHG | WEIGHT: 237 LBS

## 2024-04-12 DIAGNOSIS — I48.21 PERMANENT ATRIAL FIBRILLATION (HCC): Primary | ICD-10-CM

## 2024-04-12 DIAGNOSIS — Z95.0 CARDIAC RESYNCHRONIZATION THERAPY PACEMAKER (CRT-P) IN PLACE: ICD-10-CM

## 2024-04-12 DIAGNOSIS — I42.8 NONISCHEMIC CARDIOMYOPATHY (HCC): ICD-10-CM

## 2024-04-12 PROCEDURE — 99214 OFFICE O/P EST MOD 30 MIN: CPT | Performed by: INTERNAL MEDICINE

## 2024-04-12 PROCEDURE — 1123F ACP DISCUSS/DSCN MKR DOCD: CPT | Performed by: INTERNAL MEDICINE

## 2024-04-12 RX ORDER — LANOLIN ALCOHOL/MO/W.PET/CERES
400 CREAM (GRAM) TOPICAL DAILY
Qty: 90 TABLET | Refills: 3 | Status: SHIPPED | OUTPATIENT
Start: 2024-04-12

## 2024-04-12 RX ORDER — ACETAMINOPHEN 500 MG
1000 TABLET ORAL 2 TIMES DAILY PRN
COMMUNITY
Start: 2020-07-14

## 2024-04-12 RX ORDER — POTASSIUM CHLORIDE 1500 MG/1
20 TABLET, EXTENDED RELEASE ORAL DAILY
COMMUNITY
Start: 2024-03-13 | End: 2024-04-12 | Stop reason: SDUPTHER

## 2024-04-12 RX ORDER — POTASSIUM CHLORIDE 1500 MG/1
20 TABLET, EXTENDED RELEASE ORAL DAILY
Qty: 90 TABLET | Refills: 3 | Status: SHIPPED | OUTPATIENT
Start: 2024-04-12

## 2024-05-01 ENCOUNTER — TELEPHONE (OUTPATIENT)
Age: 79
End: 2024-05-01

## 2024-05-01 NOTE — TELEPHONE ENCOUNTER
metoprolol tartrate 25 mg tablet; take 0.5 tablets by mouth 2 times daily    90 tabs with 3 refills called to [facility] in [place].     Spoke with Dee Dee, Pharmacist who took the verbal refill request

## 2024-05-01 NOTE — TELEPHONE ENCOUNTER
Carolina is calling wrote a prescription for etoprolol tartrate (LOPRESSOR) 25 MG tablet (Sent 4/12) .  Was sent to [facility]  in [place].  Said they have been calling us as the RX is not ledgible  and that RX as destroyed  and she has been trying to call but due to our phone system cannot get anyone to talk to.    Pt has not been taking and needs new RX today if at all possible.    [facility] [place], [phone], fax [phone].       No

## 2024-06-20 NOTE — PROGRESS NOTES
study results discussed with patient as well as pathophysiology of sleep apnea.  We did discuss changing to the AirFit F40 size medium and she was provided a sample with this today.  She is to resume her CPAP with oxygen.  PSG/Split 2014          Sleepiness Scale:       6/21/2024     2:44 PM   Sleep Medicine   Sitting and reading 2   Watching TV 2   Sitting, inactive in a public place (e.g. a theatre or a meeting) 2   As a passenger in a car for an hour without a break 2   Lying down to rest in the afternoon when circumstances permit 2   Sitting and talking to someone 2   Sitting quietly after a lunch without alcohol 2   In a car, while stopped for a few minutes in traffic 1   [place] Sleepiness Score 15        Past Medical History:   Diagnosis Date    Atrial fibrillation (HCC)     Gastric ulcer     GERD (gastroesophageal reflux disease)     Glaucoma     Kidney stone     OAB (overactive bladder)     Sleep apnea     Tardive dyskinesia     Teeth grinding     UTI (urinary tract infection)          Patient Active Problem List   Diagnosis    Severe obesity (HCC)    Hx of atrioventricular node ablation    Permanent atrial fibrillation (HCC)    Nonischemic cardiomyopathy (HCC)    Cardiac resynchronization therapy pacemaker (CRT-P) in place    Obstructive sleep apnea    Prediabetes           Past Surgical History:   Procedure Laterality Date    CATARACT REMOVAL      DILATION AND CURETTAGE OF UTERUS      GASTRECTOMY      GASTRIC BYPASS SURGERY      complicated by ulcer    HERNIA REPAIR      HX JOINT REPLACEMENT SURGERY Left     knee    HYSTERECTOMY (CERVIX STATUS UNKNOWN)      LAP,CHOLECYSTECTOMY      ORTHOPEDIC SURGERY      MT APPENDECTOMY      TONSILLECTOMY AND ADENOIDECTOMY           Social History     Socioeconomic History    Marital status: Single     Spouse name: Not on file    Number of children: Not on file    Years of education: Not on file    Highest education level: Not on file   Occupational History    Not on

## 2024-06-21 ENCOUNTER — OFFICE VISIT (OUTPATIENT)
Dept: SLEEP MEDICINE | Age: 79
End: 2024-06-21

## 2024-06-21 VITALS
RESPIRATION RATE: 16 BRPM | BODY MASS INDEX: 42.77 KG/M2 | HEIGHT: 63 IN | DIASTOLIC BLOOD PRESSURE: 83 MMHG | TEMPERATURE: 97.5 F | WEIGHT: 241.4 LBS | OXYGEN SATURATION: 94 % | HEART RATE: 89 BPM | SYSTOLIC BLOOD PRESSURE: 132 MMHG

## 2024-06-21 DIAGNOSIS — G47.00 PERSISTENT DISORDER OF INITIATING OR MAINTAINING SLEEP: ICD-10-CM

## 2024-06-21 DIAGNOSIS — G47.10 HYPERSOMNIA: ICD-10-CM

## 2024-06-21 DIAGNOSIS — E66.01 MORBID OBESITY WITH BMI OF 40.0-44.9, ADULT (HCC): ICD-10-CM

## 2024-06-21 DIAGNOSIS — G47.33 OSA (OBSTRUCTIVE SLEEP APNEA): Primary | ICD-10-CM

## 2024-06-21 DIAGNOSIS — G47.34 NOCTURNAL HYPOXEMIA: ICD-10-CM

## 2024-06-21 ASSESSMENT — SLEEP AND FATIGUE QUESTIONNAIRES
HOW LIKELY ARE YOU TO NOD OFF OR FALL ASLEEP WHILE SITTING AND READING: MODERATE CHANCE OF DOZING
HOW LIKELY ARE YOU TO NOD OFF OR FALL ASLEEP WHILE LYING DOWN TO REST IN THE AFTERNOON WHEN CIRCUMSTANCES PERMIT: MODERATE CHANCE OF DOZING
HOW LIKELY ARE YOU TO NOD OFF OR FALL ASLEEP WHILE SITTING QUIETLY AFTER LUNCH WITHOUT ALCOHOL: MODERATE CHANCE OF DOZING
HOW LIKELY ARE YOU TO NOD OFF OR FALL ASLEEP WHILE WATCHING TV: MODERATE CHANCE OF DOZING
ESS TOTAL SCORE: 15
HOW LIKELY ARE YOU TO NOD OFF OR FALL ASLEEP WHILE SITTING AND TALKING TO SOMEONE: MODERATE CHANCE OF DOZING
HOW LIKELY ARE YOU TO NOD OFF OR FALL ASLEEP WHEN YOU ARE A PASSENGER IN A CAR FOR AN HOUR WITHOUT A BREAK: MODERATE CHANCE OF DOZING
HOW LIKELY ARE YOU TO NOD OFF OR FALL ASLEEP WHILE SITTING INACTIVE IN A PUBLIC PLACE: MODERATE CHANCE OF DOZING
HOW LIKELY ARE YOU TO NOD OFF OR FALL ASLEEP IN A CAR, WHILE STOPPED FOR A FEW MINUTES IN TRAFFIC: SLIGHT CHANCE OF DOZING

## 2024-06-21 NOTE — PATIENT INSTRUCTIONS
The company who will be taking care of your CPAP supplies is:        Address: [address] [place] [address] [place] [address]  Phone: [phone]  Fax: [phone]

## 2024-07-15 DIAGNOSIS — N39.41 URGE INCONTINENCE: ICD-10-CM

## 2024-07-15 RX ORDER — MIRABEGRON 50 MG/1
25 TABLET, EXTENDED RELEASE ORAL DAILY
Qty: 90 TABLET | Refills: 0 | Status: SHIPPED | OUTPATIENT
Start: 2024-07-15

## 2024-07-19 ENCOUNTER — PATIENT MESSAGE (OUTPATIENT)
Dept: SLEEP MEDICINE | Age: 79
End: 2024-07-19

## 2024-07-22 NOTE — TELEPHONE ENCOUNTER
From: Petra Head  To: Kateryna Cohen  Sent: 7/19/2024 12:59 PM EDT  Subject: Mask    Kateryna,  Hope this finds you enjoying your new daughter! Sending good thoughts. Wanted to let you know that the new mask we were trying isn't working. I think I need to go back to the full mask that I had before. Is it possible for your office to order the setup for it? Do you need any other information?   Thanks,   Ela

## 2024-07-26 ENCOUNTER — PATIENT MESSAGE (OUTPATIENT)
Dept: UROLOGY | Age: 79
End: 2024-07-26

## 2024-07-26 DIAGNOSIS — N32.81 OVERACTIVE BLADDER: Primary | ICD-10-CM

## 2024-07-26 RX ORDER — MIRABEGRON 50 MG/1
50 TABLET, EXTENDED RELEASE ORAL DAILY
Qty: 90 TABLET | Refills: 1 | Status: SHIPPED | OUTPATIENT
Start: 2024-07-26

## 2024-07-26 NOTE — TELEPHONE ENCOUNTER
From: Edilia Dey  To: Petra Say Head  Sent: 7/26/2024 7:46 AM EDT  Subject: myrbetriq    Mrs. Head,   Did you get the refill for myrbetriq? I'm not sure what's going on with that script, but I didn't stop it.   Edilia Dey, APRN - CNP

## 2024-10-16 PROBLEM — E66.01 MORBID OBESITY: Status: ACTIVE | Noted: 2019-10-23

## 2024-10-16 NOTE — PROGRESS NOTES
tablet; Take 0.5 tablets by mouth 2 times daily TK 1 T PO BID          IMPRESSION:    Based on their description, I suspect these episodes may be panic/anxiety.  Will get CXR and basic labs to evaluate for other drivers of dyspnea but would expect any of those issues to be more persistent rather than intermittent.     Another possibility is dysphagia with GERD, working to reschedule her GI appt    Advised urgent care for high fever, purulent sputum.        Will call results        Return in about 6 months (around 4/18/2025) for ECHO BEFORE VISIT.          Thank you for allowing me to participate in this patient's care.  Please call or contact me if there are any questions or concerns regarding the above.      ISH SALEH MD  10/18/24  12:10 PM

## 2024-10-17 DIAGNOSIS — N39.0 RECURRENT UTI: ICD-10-CM

## 2024-10-17 RX ORDER — NITROFURANTOIN MACROCRYSTALS 50 MG/1
50 CAPSULE ORAL DAILY
Qty: 90 CAPSULE | Refills: 3 | Status: SHIPPED | OUTPATIENT
Start: 2024-10-17

## 2024-10-18 ENCOUNTER — OFFICE VISIT (OUTPATIENT)
Age: 79
End: 2024-10-18
Payer: MEDICARE

## 2024-10-18 VITALS — WEIGHT: 233.4 LBS | HEIGHT: 63 IN | BODY MASS INDEX: 41.36 KG/M2 | HEART RATE: 77 BPM

## 2024-10-18 DIAGNOSIS — Z75.8 DOES NOT HAVE PRIMARY CARE PROVIDER: ICD-10-CM

## 2024-10-18 DIAGNOSIS — I48.21 PERMANENT ATRIAL FIBRILLATION (HCC): Primary | ICD-10-CM

## 2024-10-18 DIAGNOSIS — I50.32 CHRONIC HEART FAILURE WITH PRESERVED EJECTION FRACTION (HFPEF) (HCC): ICD-10-CM

## 2024-10-18 DIAGNOSIS — I42.8 NONISCHEMIC CARDIOMYOPATHY (HCC): ICD-10-CM

## 2024-10-18 DIAGNOSIS — E66.01 MORBID OBESITY: ICD-10-CM

## 2024-10-18 DIAGNOSIS — Z98.890 HX OF ATRIOVENTRICULAR NODE ABLATION: ICD-10-CM

## 2024-10-18 DIAGNOSIS — R06.02 SHORTNESS OF BREATH: ICD-10-CM

## 2024-10-18 DIAGNOSIS — Z95.0 CARDIAC RESYNCHRONIZATION THERAPY PACEMAKER (CRT-P) IN PLACE: ICD-10-CM

## 2024-10-18 LAB
ANION GAP SERPL CALC-SCNC: 11 MMOL/L (ref 9–18)
BUN SERPL-MCNC: 19 MG/DL (ref 8–23)
CALCIUM SERPL-MCNC: 9.2 MG/DL (ref 8.8–10.2)
CHLORIDE SERPL-SCNC: 105 MMOL/L (ref 98–107)
CO2 SERPL-SCNC: 24 MMOL/L (ref 20–28)
CREAT SERPL-MCNC: 0.79 MG/DL (ref 0.6–1.1)
ERYTHROCYTE [DISTWIDTH] IN BLOOD BY AUTOMATED COUNT: 13.8 % (ref 11.9–14.6)
GLUCOSE SERPL-MCNC: 133 MG/DL (ref 70–99)
HCT VFR BLD AUTO: 46.6 % (ref 35.8–46.3)
HGB BLD-MCNC: 14.7 G/DL (ref 11.7–15.4)
MCH RBC QN AUTO: 32.5 PG (ref 26.1–32.9)
MCHC RBC AUTO-ENTMCNC: 31.5 G/DL (ref 31.4–35)
MCV RBC AUTO: 103.1 FL (ref 82–102)
NRBC # BLD: 0 K/UL (ref 0–0.2)
NT PRO BNP: 864 PG/ML (ref 0–450)
PLATELET # BLD AUTO: 285 K/UL (ref 150–450)
PMV BLD AUTO: 9.8 FL (ref 9.4–12.3)
POTASSIUM SERPL-SCNC: 4.4 MMOL/L (ref 3.5–5.1)
RBC # BLD AUTO: 4.52 M/UL (ref 4.05–5.2)
SODIUM SERPL-SCNC: 140 MMOL/L (ref 136–145)
WBC # BLD AUTO: 8 K/UL (ref 4.3–11.1)

## 2024-10-18 PROCEDURE — 99214 OFFICE O/P EST MOD 30 MIN: CPT | Performed by: INTERNAL MEDICINE

## 2024-10-18 PROCEDURE — 1123F ACP DISCUSS/DSCN MKR DOCD: CPT | Performed by: INTERNAL MEDICINE

## 2024-10-18 RX ORDER — TORSEMIDE 20 MG/1
20 TABLET ORAL DAILY
Qty: 90 TABLET | Refills: 3 | Status: SHIPPED | OUTPATIENT
Start: 2024-10-18

## 2024-10-18 RX ORDER — POTASSIUM CHLORIDE 1500 MG/1
20 TABLET, EXTENDED RELEASE ORAL DAILY
Qty: 90 TABLET | Refills: 3 | Status: SHIPPED | OUTPATIENT
Start: 2024-10-18

## 2024-10-18 RX ORDER — METOPROLOL TARTRATE 25 MG/1
12.5 TABLET, FILM COATED ORAL 2 TIMES DAILY
Qty: 90 TABLET | Refills: 3 | Status: SHIPPED | OUTPATIENT
Start: 2024-10-18

## 2024-10-18 ASSESSMENT — ENCOUNTER SYMPTOMS
COUGH: 1
SHORTNESS OF BREATH: 1

## 2024-10-23 ENCOUNTER — HOSPITAL ENCOUNTER (INPATIENT)
Age: 79
LOS: 1 days | Discharge: HOME HEALTH | DRG: 065 | End: 2024-10-25
Attending: EMERGENCY MEDICINE | Admitting: HOSPITALIST
Payer: MEDICARE

## 2024-10-23 ENCOUNTER — APPOINTMENT (OUTPATIENT)
Dept: GENERAL RADIOLOGY | Age: 79
DRG: 065 | End: 2024-10-23
Payer: MEDICARE

## 2024-10-23 ENCOUNTER — APPOINTMENT (OUTPATIENT)
Dept: CT IMAGING | Age: 79
DRG: 065 | End: 2024-10-23
Payer: MEDICARE

## 2024-10-23 DIAGNOSIS — G45.9 TIA (TRANSIENT ISCHEMIC ATTACK): Primary | ICD-10-CM

## 2024-10-23 DIAGNOSIS — I63.9 ACUTE ISCHEMIC STROKE (HCC): ICD-10-CM

## 2024-10-23 LAB
ALBUMIN SERPL-MCNC: 3.4 G/DL (ref 3.2–4.6)
ALBUMIN/GLOB SERPL: 0.9 (ref 1–1.9)
ALP SERPL-CCNC: 93 U/L (ref 35–104)
ALT SERPL-CCNC: 10 U/L (ref 8–45)
ANION GAP SERPL CALC-SCNC: 10 MMOL/L (ref 9–18)
AST SERPL-CCNC: 36 U/L (ref 15–37)
BASOPHILS # BLD: 0.1 K/UL (ref 0–0.2)
BASOPHILS NFR BLD: 1 % (ref 0–2)
BILIRUB SERPL-MCNC: 0.3 MG/DL (ref 0–1.2)
BUN SERPL-MCNC: 20 MG/DL (ref 8–23)
CALCIUM SERPL-MCNC: 8.9 MG/DL (ref 8.8–10.2)
CHLORIDE SERPL-SCNC: 107 MMOL/L (ref 98–107)
CO2 SERPL-SCNC: 27 MMOL/L (ref 20–28)
CREAT SERPL-MCNC: 0.77 MG/DL (ref 0.6–1.1)
DIFFERENTIAL METHOD BLD: ABNORMAL
EKG ATRIAL RATE: 103 BPM
EKG DIAGNOSIS: NORMAL
EKG P AXIS: 0 DEGREES
EKG P-R INTERVAL: 48 MS
EKG Q-T INTERVAL: 438 MS
EKG QRS DURATION: 135 MS
EKG QTC CALCULATION (BAZETT): 476 MS
EKG R AXIS: 254 DEGREES
EKG T AXIS: 68 DEGREES
EKG VENTRICULAR RATE: 71 BPM
EOSINOPHIL # BLD: 0.2 K/UL (ref 0–0.8)
EOSINOPHIL NFR BLD: 2 % (ref 0.5–7.8)
ERYTHROCYTE [DISTWIDTH] IN BLOOD BY AUTOMATED COUNT: 14.2 % (ref 11.9–14.6)
GLOBULIN SER CALC-MCNC: 3.7 G/DL (ref 2.3–3.5)
GLUCOSE SERPL-MCNC: 163 MG/DL (ref 70–99)
HCT VFR BLD AUTO: 47.2 % (ref 35.8–46.3)
HGB BLD-MCNC: 14.8 G/DL (ref 11.7–15.4)
IMM GRANULOCYTES # BLD AUTO: 0 K/UL (ref 0–0.5)
IMM GRANULOCYTES NFR BLD AUTO: 0 % (ref 0–5)
INR PPP: 1.1
LYMPHOCYTES # BLD: 2.7 K/UL (ref 0.5–4.6)
LYMPHOCYTES NFR BLD: 32 % (ref 13–44)
MAGNESIUM SERPL-MCNC: 1.9 MG/DL (ref 1.8–2.4)
MCH RBC QN AUTO: 32.2 PG (ref 26.1–32.9)
MCHC RBC AUTO-ENTMCNC: 31.4 G/DL (ref 31.4–35)
MCV RBC AUTO: 102.8 FL (ref 82–102)
MONOCYTES # BLD: 0.6 K/UL (ref 0.1–1.3)
MONOCYTES NFR BLD: 7 % (ref 4–12)
NEUTS SEG # BLD: 4.8 K/UL (ref 1.7–8.2)
NEUTS SEG NFR BLD: 58 % (ref 43–78)
NRBC # BLD: 0 K/UL (ref 0–0.2)
NT PRO BNP: 836 PG/ML (ref 0–450)
PLATELET # BLD AUTO: 260 K/UL (ref 150–450)
PMV BLD AUTO: 9.3 FL (ref 9.4–12.3)
POTASSIUM SERPL-SCNC: 4.9 MMOL/L (ref 3.5–5.1)
PROT SERPL-MCNC: 7.1 G/DL (ref 6.3–8.2)
PROTHROMBIN TIME: 14.6 SEC (ref 11.3–14.9)
RBC # BLD AUTO: 4.59 M/UL (ref 4.05–5.2)
SODIUM SERPL-SCNC: 143 MMOL/L (ref 136–145)
TROPONIN T SERPL HS-MCNC: 17 NG/L (ref 0–14)
WBC # BLD AUTO: 8.3 K/UL (ref 4.3–11.1)

## 2024-10-23 PROCEDURE — 93010 ELECTROCARDIOGRAM REPORT: CPT | Performed by: INTERNAL MEDICINE

## 2024-10-23 PROCEDURE — 85025 COMPLETE CBC W/AUTO DIFF WBC: CPT

## 2024-10-23 PROCEDURE — 6370000000 HC RX 637 (ALT 250 FOR IP): Performed by: EMERGENCY MEDICINE

## 2024-10-23 PROCEDURE — 85610 PROTHROMBIN TIME: CPT

## 2024-10-23 PROCEDURE — 71045 X-RAY EXAM CHEST 1 VIEW: CPT

## 2024-10-23 PROCEDURE — 84484 ASSAY OF TROPONIN QUANT: CPT

## 2024-10-23 PROCEDURE — 83735 ASSAY OF MAGNESIUM: CPT

## 2024-10-23 PROCEDURE — 70450 CT HEAD/BRAIN W/O DYE: CPT

## 2024-10-23 PROCEDURE — 99285 EMERGENCY DEPT VISIT HI MDM: CPT

## 2024-10-23 PROCEDURE — 93005 ELECTROCARDIOGRAM TRACING: CPT | Performed by: EMERGENCY MEDICINE

## 2024-10-23 PROCEDURE — G0378 HOSPITAL OBSERVATION PER HR: HCPCS

## 2024-10-23 PROCEDURE — 80053 COMPREHEN METABOLIC PANEL: CPT

## 2024-10-23 PROCEDURE — 83880 ASSAY OF NATRIURETIC PEPTIDE: CPT

## 2024-10-23 RX ORDER — SENNA AND DOCUSATE SODIUM 50; 8.6 MG/1; MG/1
1 TABLET, FILM COATED ORAL 2 TIMES DAILY
Status: DISCONTINUED | OUTPATIENT
Start: 2024-10-23 | End: 2024-10-25 | Stop reason: HOSPADM

## 2024-10-23 RX ORDER — PROMETHAZINE HYDROCHLORIDE 25 MG/1
25 TABLET ORAL EVERY 6 HOURS PRN
Status: DISCONTINUED | OUTPATIENT
Start: 2024-10-23 | End: 2024-10-25 | Stop reason: HOSPADM

## 2024-10-23 RX ORDER — PANTOPRAZOLE SODIUM 40 MG/1
40 TABLET, DELAYED RELEASE ORAL
Status: DISCONTINUED | OUTPATIENT
Start: 2024-10-24 | End: 2024-10-24

## 2024-10-23 RX ORDER — ACETAMINOPHEN 325 MG/1
650 TABLET ORAL EVERY 4 HOURS PRN
Status: DISCONTINUED | OUTPATIENT
Start: 2024-10-23 | End: 2024-10-25 | Stop reason: HOSPADM

## 2024-10-23 RX ORDER — ONDANSETRON 2 MG/ML
4 INJECTION INTRAMUSCULAR; INTRAVENOUS EVERY 4 HOURS PRN
Status: DISCONTINUED | OUTPATIENT
Start: 2024-10-23 | End: 2024-10-25 | Stop reason: HOSPADM

## 2024-10-23 RX ORDER — METOPROLOL TARTRATE 25 MG/1
12.5 TABLET, FILM COATED ORAL 2 TIMES DAILY
Status: DISCONTINUED | OUTPATIENT
Start: 2024-10-23 | End: 2024-10-25 | Stop reason: HOSPADM

## 2024-10-23 RX ORDER — SODIUM CHLORIDE 0.9 % (FLUSH) 0.9 %
5-40 SYRINGE (ML) INJECTION PRN
Status: DISCONTINUED | OUTPATIENT
Start: 2024-10-23 | End: 2024-10-25 | Stop reason: HOSPADM

## 2024-10-23 RX ORDER — ATORVASTATIN CALCIUM 40 MG/1
40 TABLET, FILM COATED ORAL NIGHTLY
Status: DISCONTINUED | OUTPATIENT
Start: 2024-10-23 | End: 2024-10-24

## 2024-10-23 RX ORDER — LABETALOL HYDROCHLORIDE 5 MG/ML
10 INJECTION, SOLUTION INTRAVENOUS EVERY 10 MIN PRN
Status: DISCONTINUED | OUTPATIENT
Start: 2024-10-23 | End: 2024-10-25 | Stop reason: HOSPADM

## 2024-10-23 RX ORDER — ACETAMINOPHEN 500 MG
1000 TABLET ORAL
Status: COMPLETED | OUTPATIENT
Start: 2024-10-23 | End: 2024-10-23

## 2024-10-23 RX ORDER — ACETAMINOPHEN 650 MG/1
650 SUPPOSITORY RECTAL EVERY 4 HOURS PRN
Status: DISCONTINUED | OUTPATIENT
Start: 2024-10-23 | End: 2024-10-25 | Stop reason: HOSPADM

## 2024-10-23 RX ORDER — NITROFURANTOIN 25; 75 MG/1; MG/1
100 CAPSULE ORAL DAILY
Status: DISCONTINUED | OUTPATIENT
Start: 2024-10-24 | End: 2024-10-25 | Stop reason: HOSPADM

## 2024-10-23 RX ORDER — POLYETHYLENE GLYCOL 3350 17 G/17G
17 POWDER, FOR SOLUTION ORAL DAILY PRN
Status: DISCONTINUED | OUTPATIENT
Start: 2024-10-23 | End: 2024-10-25 | Stop reason: HOSPADM

## 2024-10-23 RX ORDER — SODIUM CHLORIDE 0.9 % (FLUSH) 0.9 %
5-40 SYRINGE (ML) INJECTION EVERY 12 HOURS SCHEDULED
Status: DISCONTINUED | OUTPATIENT
Start: 2024-10-23 | End: 2024-10-25 | Stop reason: HOSPADM

## 2024-10-23 RX ORDER — SODIUM CHLORIDE 9 MG/ML
INJECTION, SOLUTION INTRAVENOUS PRN
Status: DISCONTINUED | OUTPATIENT
Start: 2024-10-23 | End: 2024-10-25 | Stop reason: HOSPADM

## 2024-10-23 RX ORDER — TROSPIUM CHLORIDE 20 MG/1
20 TABLET, FILM COATED ORAL NIGHTLY
Status: DISCONTINUED | OUTPATIENT
Start: 2024-10-23 | End: 2024-10-24 | Stop reason: SDUPTHER

## 2024-10-23 RX ADMIN — ACETAMINOPHEN 1000 MG: 500 TABLET, FILM COATED ORAL at 21:59

## 2024-10-23 ASSESSMENT — PAIN - FUNCTIONAL ASSESSMENT: PAIN_FUNCTIONAL_ASSESSMENT: NONE - DENIES PAIN

## 2024-10-23 ASSESSMENT — PAIN SCALES - GENERAL: PAINLEVEL_OUTOF10: 8

## 2024-10-24 ENCOUNTER — TELEPHONE (OUTPATIENT)
Age: 79
End: 2024-10-24

## 2024-10-24 ENCOUNTER — APPOINTMENT (OUTPATIENT)
Dept: MRI IMAGING | Age: 79
DRG: 065 | End: 2024-10-24
Payer: MEDICARE

## 2024-10-24 ENCOUNTER — APPOINTMENT (OUTPATIENT)
Dept: CT IMAGING | Age: 79
DRG: 065 | End: 2024-10-24
Payer: MEDICARE

## 2024-10-24 LAB
ANION GAP SERPL CALC-SCNC: 8 MMOL/L (ref 9–18)
BUN SERPL-MCNC: 20 MG/DL (ref 8–23)
CALCIUM SERPL-MCNC: 8.8 MG/DL (ref 8.8–10.2)
CHLORIDE SERPL-SCNC: 107 MMOL/L (ref 98–107)
CHOLEST SERPL-MCNC: 126 MG/DL (ref 0–200)
CO2 SERPL-SCNC: 26 MMOL/L (ref 20–28)
CREAT SERPL-MCNC: 0.76 MG/DL (ref 0.6–1.1)
ERYTHROCYTE [DISTWIDTH] IN BLOOD BY AUTOMATED COUNT: 14 % (ref 11.9–14.6)
EST. AVERAGE GLUCOSE BLD GHB EST-MCNC: 147 MG/DL
GLUCOSE SERPL-MCNC: 122 MG/DL (ref 70–99)
HBA1C MFR BLD: 6.7 % (ref 0–5.6)
HCT VFR BLD AUTO: 47.1 % (ref 35.8–46.3)
HDLC SERPL-MCNC: 31 MG/DL (ref 40–60)
HDLC SERPL: 4 (ref 0–5)
HGB BLD-MCNC: 14.9 G/DL (ref 11.7–15.4)
LDLC SERPL CALC-MCNC: 69 MG/DL (ref 0–100)
MCH RBC QN AUTO: 33.3 PG (ref 26.1–32.9)
MCHC RBC AUTO-ENTMCNC: 31.6 G/DL (ref 31.4–35)
MCV RBC AUTO: 105.1 FL (ref 82–102)
NRBC # BLD: 0 K/UL (ref 0–0.2)
PLATELET # BLD AUTO: 244 K/UL (ref 150–450)
PMV BLD AUTO: 9.4 FL (ref 9.4–12.3)
POTASSIUM SERPL-SCNC: 4.7 MMOL/L (ref 3.5–5.1)
RBC # BLD AUTO: 4.48 M/UL (ref 4.05–5.2)
SODIUM SERPL-SCNC: 141 MMOL/L (ref 136–145)
TRIGL SERPL-MCNC: 128 MG/DL (ref 0–150)
VLDLC SERPL CALC-MCNC: 26 MG/DL (ref 6–23)
WBC # BLD AUTO: 9 K/UL (ref 4.3–11.1)

## 2024-10-24 PROCEDURE — G0378 HOSPITAL OBSERVATION PER HR: HCPCS

## 2024-10-24 PROCEDURE — 97530 THERAPEUTIC ACTIVITIES: CPT

## 2024-10-24 PROCEDURE — 6370000000 HC RX 637 (ALT 250 FOR IP): Performed by: HOSPITALIST

## 2024-10-24 PROCEDURE — 80061 LIPID PANEL: CPT

## 2024-10-24 PROCEDURE — 6370000000 HC RX 637 (ALT 250 FOR IP)

## 2024-10-24 PROCEDURE — 92523 SPEECH SOUND LANG COMPREHEN: CPT

## 2024-10-24 PROCEDURE — 97165 OT EVAL LOW COMPLEX 30 MIN: CPT

## 2024-10-24 PROCEDURE — 97161 PT EVAL LOW COMPLEX 20 MIN: CPT

## 2024-10-24 PROCEDURE — 70498 CT ANGIOGRAPHY NECK: CPT

## 2024-10-24 PROCEDURE — 97535 SELF CARE MNGMENT TRAINING: CPT

## 2024-10-24 PROCEDURE — 6370000000 HC RX 637 (ALT 250 FOR IP): Performed by: INTERNAL MEDICINE

## 2024-10-24 PROCEDURE — 36415 COLL VENOUS BLD VENIPUNCTURE: CPT

## 2024-10-24 PROCEDURE — 80048 BASIC METABOLIC PNL TOTAL CA: CPT

## 2024-10-24 PROCEDURE — 99222 1ST HOSP IP/OBS MODERATE 55: CPT | Performed by: PSYCHIATRY & NEUROLOGY

## 2024-10-24 PROCEDURE — 70551 MRI BRAIN STEM W/O DYE: CPT

## 2024-10-24 PROCEDURE — 92610 EVALUATE SWALLOWING FUNCTION: CPT

## 2024-10-24 PROCEDURE — 85027 COMPLETE CBC AUTOMATED: CPT

## 2024-10-24 PROCEDURE — 2580000003 HC RX 258: Performed by: HOSPITALIST

## 2024-10-24 PROCEDURE — 6360000004 HC RX CONTRAST MEDICATION: Performed by: HOSPITALIST

## 2024-10-24 PROCEDURE — 83036 HEMOGLOBIN GLYCOSYLATED A1C: CPT

## 2024-10-24 RX ORDER — DONEPEZIL HYDROCHLORIDE 5 MG/1
10 TABLET, FILM COATED ORAL NIGHTLY
Status: DISCONTINUED | OUTPATIENT
Start: 2024-10-24 | End: 2024-10-25 | Stop reason: HOSPADM

## 2024-10-24 RX ORDER — TORSEMIDE 20 MG/1
20 TABLET ORAL DAILY
Status: DISCONTINUED | OUTPATIENT
Start: 2024-10-24 | End: 2024-10-25 | Stop reason: HOSPADM

## 2024-10-24 RX ORDER — VENLAFAXINE 75 MG/1
150 TABLET ORAL 2 TIMES DAILY
Status: DISCONTINUED | OUTPATIENT
Start: 2024-10-24 | End: 2024-10-25 | Stop reason: HOSPADM

## 2024-10-24 RX ORDER — LANOLIN ALCOHOL/MO/W.PET/CERES
400 CREAM (GRAM) TOPICAL NIGHTLY
Status: DISCONTINUED | OUTPATIENT
Start: 2024-10-24 | End: 2024-10-25 | Stop reason: HOSPADM

## 2024-10-24 RX ORDER — PANTOPRAZOLE SODIUM 40 MG/1
40 TABLET, DELAYED RELEASE ORAL
Status: DISCONTINUED | OUTPATIENT
Start: 2024-10-24 | End: 2024-10-25 | Stop reason: HOSPADM

## 2024-10-24 RX ORDER — IOPAMIDOL 755 MG/ML
60 INJECTION, SOLUTION INTRAVASCULAR
Status: COMPLETED | OUTPATIENT
Start: 2024-10-24 | End: 2024-10-24

## 2024-10-24 RX ORDER — FLUTICASONE PROPIONATE 50 MCG
1 SPRAY, SUSPENSION (ML) NASAL DAILY PRN
Status: DISCONTINUED | OUTPATIENT
Start: 2024-10-24 | End: 2024-10-25 | Stop reason: HOSPADM

## 2024-10-24 RX ORDER — TROSPIUM CHLORIDE 20 MG/1
20 TABLET, FILM COATED ORAL NIGHTLY
Status: DISCONTINUED | OUTPATIENT
Start: 2024-10-24 | End: 2024-10-25 | Stop reason: HOSPADM

## 2024-10-24 RX ORDER — POTASSIUM CHLORIDE 1500 MG/1
20 TABLET, EXTENDED RELEASE ORAL DAILY
Status: DISCONTINUED | OUTPATIENT
Start: 2024-10-24 | End: 2024-10-25 | Stop reason: HOSPADM

## 2024-10-24 RX ORDER — NITROFURANTOIN MACROCRYSTALS 50 MG/1
50 CAPSULE ORAL DAILY
Status: DISCONTINUED | OUTPATIENT
Start: 2024-10-24 | End: 2024-10-24 | Stop reason: SDUPTHER

## 2024-10-24 RX ADMIN — VENLAFAXINE 150 MG: 75 TABLET ORAL at 20:57

## 2024-10-24 RX ADMIN — NITROFURANTOIN MONOHYDRATE/MACROCRYSTALS 100 MG: 75; 25 CAPSULE ORAL at 20:56

## 2024-10-24 RX ADMIN — RIVAROXABAN 20 MG: 20 TABLET, FILM COATED ORAL at 00:17

## 2024-10-24 RX ADMIN — POTASSIUM CHLORIDE 20 MEQ: 1500 TABLET, EXTENDED RELEASE ORAL at 13:34

## 2024-10-24 RX ADMIN — METOPROLOL TARTRATE 12.5 MG: 25 TABLET, FILM COATED ORAL at 08:14

## 2024-10-24 RX ADMIN — FLUTICASONE PROPIONATE 1 SPRAY: 50 SPRAY, METERED NASAL at 21:03

## 2024-10-24 RX ADMIN — TROSPIUM CHLORIDE 20 MG: 20 TABLET, FILM COATED ORAL at 20:55

## 2024-10-24 RX ADMIN — MAGNESIUM GLUCONATE 500 MG ORAL TABLET 400 MG: 500 TABLET ORAL at 20:55

## 2024-10-24 RX ADMIN — RIVAROXABAN 20 MG: 20 TABLET, FILM COATED ORAL at 17:03

## 2024-10-24 RX ADMIN — ACETAMINOPHEN 650 MG: 325 TABLET ORAL at 13:38

## 2024-10-24 RX ADMIN — SODIUM CHLORIDE, PRESERVATIVE FREE 10 ML: 5 INJECTION INTRAVENOUS at 08:15

## 2024-10-24 RX ADMIN — METOPROLOL TARTRATE 12.5 MG: 25 TABLET, FILM COATED ORAL at 00:16

## 2024-10-24 RX ADMIN — SODIUM CHLORIDE, PRESERVATIVE FREE 10 ML: 5 INJECTION INTRAVENOUS at 20:54

## 2024-10-24 RX ADMIN — SODIUM CHLORIDE, PRESERVATIVE FREE 10 ML: 5 INJECTION INTRAVENOUS at 00:18

## 2024-10-24 RX ADMIN — DONEPEZIL HYDROCHLORIDE 10 MG: 5 TABLET, FILM COATED ORAL at 20:54

## 2024-10-24 RX ADMIN — METOPROLOL TARTRATE 12.5 MG: 25 TABLET, FILM COATED ORAL at 20:57

## 2024-10-24 RX ADMIN — ACETAMINOPHEN 650 MG: 325 TABLET ORAL at 21:11

## 2024-10-24 RX ADMIN — ACETAMINOPHEN 650 MG: 325 TABLET ORAL at 05:03

## 2024-10-24 RX ADMIN — TROSPIUM CHLORIDE 20 MG: 20 TABLET, FILM COATED ORAL at 00:17

## 2024-10-24 RX ADMIN — IOPAMIDOL 60 ML: 755 INJECTION, SOLUTION INTRAVENOUS at 08:37

## 2024-10-24 RX ADMIN — PANTOPRAZOLE SODIUM 40 MG: 40 TABLET, DELAYED RELEASE ORAL at 17:40

## 2024-10-24 RX ADMIN — ATORVASTATIN CALCIUM 40 MG: 40 TABLET, FILM COATED ORAL at 00:17

## 2024-10-24 RX ADMIN — PANTOPRAZOLE SODIUM 40 MG: 40 TABLET, DELAYED RELEASE ORAL at 05:03

## 2024-10-24 ASSESSMENT — PAIN SCALES - GENERAL
PAINLEVEL_OUTOF10: 0
PAINLEVEL_OUTOF10: 5
PAINLEVEL_OUTOF10: 6
PAINLEVEL_OUTOF10: 5

## 2024-10-24 ASSESSMENT — PAIN DESCRIPTION - ORIENTATION
ORIENTATION: LEFT
ORIENTATION: LEFT
ORIENTATION: LOWER

## 2024-10-24 ASSESSMENT — PAIN DESCRIPTION - LOCATION
LOCATION: HIP
LOCATION: BACK
LOCATION: HEAD;KNEE

## 2024-10-24 ASSESSMENT — PAIN DESCRIPTION - DESCRIPTORS
DESCRIPTORS: ACHING
DESCRIPTORS: ACHING

## 2024-10-24 NOTE — ACP (ADVANCE CARE PLANNING)
Advance Care Planning     Advance Care Planning Inpatient Note  [facility] Department    Today's Date: 10/24/2024  Unit: SFD 8 MED SURG    Received request from HealthCare Provider.  Upon review of chart and communication with care team, patient's decision making abilities are not in question.. Patient, Healthcare Decision Maker, and Child/Children was/were present in the room during visit.    Goals of ACP Conversation:  Discuss advance care planning documents  Facilitate a discussion related to patient's goals of care as they align with the patient's values and beliefs.  Pt chose Elmer Head as primary and Carolina Palma as 2ndary agent.    Health Care Decision Makers:       Primary Decision Maker: Elmer Head - [place] - [phone]    Secondary Decision Maker: Carolina Palma - [phone]  Summary:  Verified Documents  Completed New Documents    Advance Care Planning Documents (Patient Wishes):  Healthcare Power of /Advance Directive Appointment of Health Care Agent     Assessment:  Son- Elmer Head- is primary agent. Daughter- Carolina Palma- is 2ndary agent.    Interventions:  Provided education on documents for clarity and greater understanding  Discussed and provided education on state decision maker hierarchy  Assisted in the completion of documents according to patient's wishes at this time  Encouraged ongoing ACP conversation with future decision makers and loved ones    Care Preferences Communicated:   No    Outcomes/Plan:  ACP Discussion: Completed  New advance directive completed.  Returned original document(s) to patient, as well as copies for distribution to appointed agents  Copy of advance directive given to staff to scan into medical record.  Teach Back Method used to verify the patient's and/or Healthcare Decision Maker's understanding of key information in the advance directive documents    Electronically signed by Chaplain JOANA on 10/24/2024 at 4:39 PM

## 2024-10-24 NOTE — ED TRIAGE NOTES
Pt to ED via ems with c/o numbness to left leg and slurred speech. Pt states was getting ready for bed when all the sudden she had a sharp pain to her left temple and then her left leg felt numb. Pt states fell due to leg weakness. Pt states fell onto all 4. Pt denies hitting her head or loss of conscious. No slurred speech noted at this time. Pt states takes xarelto and last dose was last night. Pt states uses 3L O2 at night. Pt denies any numbness at this time. No facial droop noted. Pt alert and able to answer all questions. Per ems pt bgl was 121 and 137. Pt states hx of afib and cardiac pace maker.

## 2024-10-24 NOTE — DISCHARGE INSTRUCTIONS
Follow-up care is a key part of your treatment and safety. Be sure to make and go to all appointments, and call your doctor if you are having problems. It's also a good idea to know your test results and keep a list of the medicines you take.    How can you care for yourself at home?   Enter a stroke rehabilitation (rehab) program, if your doctor recommends it. Physical, speech, and occupational therapies can help you manage bathing, dressing, eating, and other basics of daily living.  Eat a heart-healthy diet that is low in cholesterol, saturated fat, and salt. Eat lots of fresh fruits and vegetables and foods high in fiber.  Increase your activities slowly. Take short rest breaks when you get tired. Gradually increase the amount you walk. Start out by walking a little more than you did the day before.  Do not drive until your doctor says it is okay.  It is normal to feel sad or depressed after a stroke. If the “blues” last, talk to your doctor.  If you are having problems with urine leakage, go to the bathroom at regular times, including when you first wake up and at bedtime. Also, limit fluids after dinner.  If you are constipated, drink plenty of fluids, enough so that your urine is light yellow or clear like water. If you have kidney, heart, or liver disease and have to limit fluids, talk with your doctor before you increase the amount of fluids you drink. Set up a regular time for using the toilet. If you continue to have constipation, your doctor may suggest using a bulking agent, such as Metamucil, or a stool softener, laxative, or enema.    Medicines  Take your medicines exactly as prescribed. Call your doctor if you think you are having a problem with your medicine. You may be taking several medicines. ACE (angiotensin-converting enzyme) inhibitors, angiotensin II receptor blockers (ARBs), beta-blockers, diuretics (water pills), and calcium channel blockers control your blood pressure. Statins help lower

## 2024-10-24 NOTE — CONSULTS
Consult    Patient: Petra Head MRN: [id number]     YOB: 1945  Age: 79 y.o.  Sex: female      Subjective:      Petra Head is a 79 y.o. female, with h/o CHF, afib on xeralto, JEFF, and prediabetes, who is being seen for an episode of left facial droop, dysarthria, and left leg weakness, numbness and tingling at 9-10pm yesterday.The patient reports left sided throbbing headache. The patient denies hearing changes, vision changes, dizziness, nausea, chest pain, palpitations, confusion, left arm numbness, weakness, tingling. Patient states that she got up from the bed because she was not able to get comfortable. She walked to her bathroom and noticed that her left face was drooping and her leg was numb and tingling. She called her son but was having a hard time saying words and states that she said \"I think I am having a stroke\" but her son heard \"I think I am broke.\" He came over to help her and her left leg could not bear weight. About 15-20 minutes after those symptoms started, she developed a throbbing left sided headache. It last for about 3 minutes then her symptoms resolved. She states she still has slight tingling in her left leg but all other symptoms are resolved.     Past Medical History:   Diagnosis Date    Atrial fibrillation (HCC)     Gastric ulcer     GERD (gastroesophageal reflux disease)     Glaucoma     Kidney stone     OAB (overactive bladder)     Sleep apnea     Tardive dyskinesia     Teeth grinding     UTI (urinary tract infection)      Past Surgical History:   Procedure Laterality Date    CATARACT REMOVAL      DILATION AND CURETTAGE OF UTERUS      GASTRECTOMY      GASTRIC BYPASS SURGERY      complicated by ulcer    HERNIA REPAIR      HX JOINT REPLACEMENT SURGERY Left     knee    HYSTERECTOMY (CERVIX STATUS UNKNOWN)      LAP,CHOLECYSTECTOMY      ORTHOPEDIC SURGERY      N[place] APPENDECTOMY      TONSILLECTOMY AND ADENOIDECTOMY        Family History   Problem Relation Age

## 2024-10-24 NOTE — CONSULTS
Neurology Consult Note       History: This is a 79-year-old woman with a history of paroxysmal atrial fibrillation on rivaroxaban.  The patient had an episode of headache and left-sided numbness and weakness which persisted for 15 minutes and then resolved.  She does have some sensory abnormalities in her distal left lower extremity.    Past Medical History:   Diagnosis Date    Atrial fibrillation (HCC)     Gastric ulcer     GERD (gastroesophageal reflux disease)     Glaucoma     Kidney stone     OAB (overactive bladder)     Sleep apnea     Tardive dyskinesia     Teeth grinding     UTI (urinary tract infection)        Current Facility-Administered Medications:     sodium chloride flush 0.9 % injection 5-40 mL, 5-40 mL, IntraVENous, 2 times per day, Sherri Hernandez MD, 10 mL at 10/24/24 0815    sodium chloride flush 0.9 % injection 5-40 mL, 5-40 mL, IntraVENous, PRN, Sherri Hernandez MD    0.9 % sodium chloride infusion, , IntraVENous, PRN, Sherri Hernandez MD    polyethylene glycol (GLYCOLAX) packet 17 g, 17 g, Oral, Daily PRN, Sherri Hernandez MD    acetaminophen (TYLENOL) tablet 650 mg, 650 mg, Oral, Q4H PRN, 650 mg at 10/24/24 0503 **OR** acetaminophen (TYLENOL) suppository 650 mg, 650 mg, Rectal, Q4H PRN, Sherri Hernandez MD    sennosides-docusate sodium (SENOKOT-S) 8.6-50 MG tablet 1 tablet, 1 tablet, Oral, BID, Sherri Hernandez MD    promethazine (PHENERGAN) tablet 25 mg, 25 mg, Oral, Q6H PRN **OR** ondansetron (ZOFRAN) injection 4 mg, 4 mg, IntraVENous, Q4H PRN, Sherri Hernandez MD    labetalol (NORMODYNE;TRANDATE) injection 10 mg, 10 mg, IntraVENous, Q10 Min PRN, Sherri Hernandez MD    rivaroxaban (XARELTO) tablet 20 mg, 20 mg, Oral, Dinner, Sherri Hernandez MD, 20 mg at 10/24/24 0017    pantoprazole (PROTONIX) tablet 40 mg, 40 mg, Oral, QAM AC, Sherri Hernadnez MD, 40 mg at 10/24/24 0501    metoprolol tartrate (LOPRESSOR) tablet 12.5 mg, 12.5 mg, Oral, BID, Sherri Hernandez MD, 12.5 mg at

## 2024-10-24 NOTE — ED NOTES
TRANSFER - OUT REPORT:    Verbal report given to Darien EVANS on Petra Head  being transferred to [facility] for routine progression of patient care       Report consisted of patient's Situation, Background, Assessment and   Recommendations(SBAR).     Information from the following report(s) ED SBAR was reviewed with the receiving nurse.    [place] Fall Assessment:    Presents to emergency department  because of falls (Syncope, seizure, or loss of consciousness): Yes  Age > 70: Yes  Altered Mental Status, Intoxication with alcohol or substance confusion (Disorientation, impaired judgment, poor safety awaremess, or inability to follow instructions): No  Impaired Mobility: Ambulates or transfers with assistive devices or assistance; Unable to ambulate or transer.: No             Lines:   Peripheral IV 10/23/24 Left Antecubital (Active)   Site Assessment Clean, dry & intact 10/23/24 2045   Line Status Blood return noted 10/23/24 2045   Phlebitis Assessment No symptoms 10/23/24 2045   Infiltration Assessment 0 10/23/24 2045   Alcohol Cap Used No 10/23/24 2045   Dressing Status New dressing applied 10/23/24 2045   Dressing Type Transparent 10/23/24 2045   Dressing Intervention New 10/23/24 2045        Opportunity for questions and clarification was provided.      Patient transported with:  Chary Soto, CRISTINA  10/23/24 0732

## 2024-10-24 NOTE — ACP (ADVANCE CARE PLANNING)
Advance Care Planning   General Advance Care Planning (ACP) Conversation    Date of Conversation: 10/23/2024  Conducted with: Patient with Decision Making Capacity  Other persons present: None    Healthcare Decision Maker:    Primary Decision Maker: SonidoElmer - Child - [phone]    Secondary Decision Maker: Carolina Palma - Sugey - [phone]    Today we documented Decision Maker(s) consistent with Legal Next of Kin hierarchy.  Content/Action Overview:  Patient is ready to consider care preferences-refer to ACP Clinical Specialist  Reviewed DNR/DNI and patient elects Full Code (Attempt Resuscitation)        Length of Voluntary ACP Conversation in minutes:  <16 minutes (Non-Billable)    Jacki Medina RN

## 2024-10-24 NOTE — H&P
Hospitalist History and Physical   Admit Date:  10/23/2024  8:36 PM   Name:  Petra Head   Age:  79 y.o.  Sex:  female  :  1945   MRN:  [id number]   [facility]  [facility]    Presenting/Chief Complaint: Numbness     Reason(s) for Admission: TIA (transient ischemic attack) [G45.9]     History of Present Illness:   Petra Head is a 79 y.o. female with medical history of morbid obesity with BMI of 40.5, nonischemic cardiomyopathy with CHF preserved ejection fraction, A-fib on Xarelto, status post AV ablation, status post pacemaker, JEFF, prediabetes presents to the ER with chief complaints of left-sided facial droop, left leg numbness and dysarthria that lasted for about 15 minutes prior to arrival and resolved spontaneously.  History mostly obtained from ER physician, chart review, patient's family.  Patient's symptoms began abruptly that mainly involve left-sided facial droop, dysarthria and left lower extremity weakness and numbness.  Symptoms lasted for about 15 minutes and and were completely resolved by the time patient arrived to the hospital.  She denied having any syncopal event, lightheadedness, dizziness, blurry vision/diplopia, chest pain, shortness of breath, diaphoresis, nausea vomiting, ataxia.  VS on arrival: Tmax 98.4, RR 16, KS 84, /82, room air sats of 94%.  Chest x-ray negative for any acute pathology.  CT head with low-attenuation changes likely representing chronic microvascular enteropathic ischemic changes with no acute intracranial hemorrhage or any other pathology.  Blood work was essentially unremarkable, proBNP of 836.  Echo from 2024 with EF of 55 to 60% with abnormal diastolic function with severe dilation of LA.        Assessment & Plan:     Principal Problem:    TIA (transient ischemic attack)  Plan: 10/23-  Admit to observation in view of TIA.  Complete resolution of symptoms prior to arrival to ER.  Patient not a candidate for tPA.  CT head was

## 2024-10-24 NOTE — ED PROVIDER NOTES
not control the movements and she fell.  She fell on all fours did not hit her head.  Has no neck pain.  She states no numbness.  She is never had this before.  She reports she developed a left-sided headache after about 15 to 20 minutes and her symptoms resolved.  Her headache is now gone.  She last took her Xarelto yesterday evening.  Only other difference is she reports feeling swollen in her abdomen hands and feet today.  Patient arrives via EMS.  She was seen on arrival.  No Code S was called as her symptoms have completely resolved.    The history is provided by the patient.     Physical Exam     Vitals signs and nursing note reviewed:  Vitals:    10/23/24 2036 10/23/24 2040 10/23/24 2044 10/23/24 2151   BP: 136/82 136/82     Pulse:  84  70   Resp:  16  15   Temp:   98.4 °F (36.9 °C)    TempSrc:  Oral Oral    SpO2:  94%  98%   Weight:  103.8 kg (228 lb 14.4 oz)     Height:  1.6 m (5' 3\")        Physical Exam  Vitals and nursing note reviewed.   Constitutional:       General: She is not in acute distress.     Appearance: Normal appearance. She is not ill-appearing.   HENT:      Head: Normocephalic and atraumatic.   Eyes:      Conjunctiva/sclera: Conjunctivae normal.   Cardiovascular:      Rate and Rhythm: Normal rate and regular rhythm.   Pulmonary:      Effort: Pulmonary effort is normal. No respiratory distress.      Breath sounds: Normal breath sounds.   Abdominal:      Palpations: Abdomen is soft.      Tenderness: There is no abdominal tenderness. There is no guarding or rebound.   Musculoskeletal:         General: Normal range of motion.      Cervical back: Normal range of motion and neck supple.      Right lower leg: No edema.      Left lower leg: No edema.   Skin:     General: Skin is warm and dry.   Neurological:      General: No focal deficit present.      Mental Status: She is alert and oriented to person, place, and time.      Cranial Nerves: No cranial nerve deficit.      Sensory: No sensory

## 2024-10-24 NOTE — CARE COORDINATION
MSN, CM:  spoke with patient this AM about discharge planning.  Patient lives with her son in own home with 2 steps for entrance.  Patient is independent with most ADL's and requires a cane or rollator for ambulation.  Patient denies any HH, rehab, or palliative care.  Patient is current with oxygen only at night bleed into Cpap.  Patient does not recall oxygen company.  Patient is retired and her son drives her to all appointments.  Patient was admitted for left leg numbness, facial droop, slurred speech, and fall. Possible TIA; neurology consulted.  MRI, CTA head/neck pending.  PT/OT consulted for evaluation and recommendations.  Case Management will continue to follow for any discharge needs.       10/24/24 1111   Service Assessment   Patient Orientation Alert and Oriented   Cognition Alert   History Provided By Patient   Primary Caregiver Self   Support Systems Children   Patient's Healthcare Decision Maker is: Legal Next of Kin   PCP Verified by CM Yes   Last Visit to PCP Within last 6 months  (New PCP appt 2nd week in Nov.)   Prior Functional Level Independent in ADLs/IADLs   Current Functional Level Other (see comment)  (PT/OT consulted)   Can patient return to prior living arrangement Yes   Ability to make needs known: Good   Family able to assist with home care needs: Yes   Would you like for me to discuss the discharge plan with any other family members/significant others, and if so, who? Yes  (children)   Financial Resources Medicare   Community Resources None   Social/Functional History   Lives With Son   Type of Home House   Home Layout One level   Bathroom Shower/Tub Walk-in shower   Bathroom Toilet Standard   Bathroom Equipment Shower chair   Bathroom Accessibility Accessible   Home Equipment Cane;Rollator   Receives Help From Family   ADL Assistance Independent   Homemaking Assistance Independent   Homemaking Responsibilities Yes   Ambulation Assistance Independent   Transfer Assistance Independent

## 2024-10-24 NOTE — ED NOTES
Provided pt with turkey sandwich box and starry per Dr. Vicente.     Chary Gomez, RN  10/23/24 2222

## 2024-10-25 VITALS
DIASTOLIC BLOOD PRESSURE: 78 MMHG | WEIGHT: 240.74 LBS | OXYGEN SATURATION: 97 % | RESPIRATION RATE: 18 BRPM | HEART RATE: 71 BPM | TEMPERATURE: 98.2 F | SYSTOLIC BLOOD PRESSURE: 101 MMHG | BODY MASS INDEX: 42.66 KG/M2 | HEIGHT: 63 IN

## 2024-10-25 PROBLEM — I63.9 ACUTE ISCHEMIC STROKE (HCC): Status: ACTIVE | Noted: 2024-10-23

## 2024-10-25 PROCEDURE — 6370000000 HC RX 637 (ALT 250 FOR IP): Performed by: INTERNAL MEDICINE

## 2024-10-25 PROCEDURE — 94660 CPAP INITIATION&MGMT: CPT

## 2024-10-25 PROCEDURE — 2580000003 HC RX 258: Performed by: HOSPITALIST

## 2024-10-25 PROCEDURE — 5A09357 ASSISTANCE WITH RESPIRATORY VENTILATION, LESS THAN 24 CONSECUTIVE HOURS, CONTINUOUS POSITIVE AIRWAY PRESSURE: ICD-10-PCS | Performed by: INTERNAL MEDICINE

## 2024-10-25 PROCEDURE — 2700000000 HC OXYGEN THERAPY PER DAY

## 2024-10-25 PROCEDURE — 6370000000 HC RX 637 (ALT 250 FOR IP): Performed by: HOSPITALIST

## 2024-10-25 PROCEDURE — G0378 HOSPITAL OBSERVATION PER HR: HCPCS

## 2024-10-25 PROCEDURE — 6370000000 HC RX 637 (ALT 250 FOR IP)

## 2024-10-25 RX ADMIN — SODIUM CHLORIDE, PRESERVATIVE FREE 10 ML: 5 INJECTION INTRAVENOUS at 08:16

## 2024-10-25 RX ADMIN — PANTOPRAZOLE SODIUM 40 MG: 40 TABLET, DELAYED RELEASE ORAL at 05:50

## 2024-10-25 RX ADMIN — VENLAFAXINE 150 MG: 75 TABLET ORAL at 08:12

## 2024-10-25 RX ADMIN — METOPROLOL TARTRATE 12.5 MG: 25 TABLET, FILM COATED ORAL at 08:11

## 2024-10-25 RX ADMIN — POTASSIUM CHLORIDE 20 MEQ: 1500 TABLET, EXTENDED RELEASE ORAL at 08:12

## 2024-10-25 RX ADMIN — ACETAMINOPHEN 650 MG: 325 TABLET ORAL at 08:16

## 2024-10-25 RX ADMIN — TORSEMIDE 20 MG: 20 TABLET ORAL at 08:12

## 2024-10-25 ASSESSMENT — PAIN SCALES - GENERAL
PAINLEVEL_OUTOF10: 3
PAINLEVEL_OUTOF10: 0

## 2024-10-25 ASSESSMENT — PAIN DESCRIPTION - LOCATION: LOCATION: BACK

## 2024-10-25 ASSESSMENT — PAIN DESCRIPTION - ORIENTATION: ORIENTATION: MID;LOWER

## 2024-10-25 NOTE — PLAN OF CARE
Problem: Discharge Planning  Goal: Discharge to home or other facility with appropriate resources  Outcome: Adequate for Discharge     Problem: Skin/Tissue Integrity  Goal: Absence of new skin breakdown  Description: 1.  Monitor for areas of redness and/or skin breakdown  2.  Assess vascular access sites hourly  3.  Every 4-6 hours minimum:  Change oxygen saturation probe site  4.  Every 4-6 hours:  If on nasal continuous positive airway pressure, respiratory therapy assess nares and determine need for appliance change or resting period.  Outcome: Adequate for Discharge     Problem: Safety - Adult  Goal: Free from fall injury  Outcome: Adequate for Discharge     Problem: Pain  Goal: Verbalizes/displays adequate comfort level or baseline comfort level  Outcome: Adequate for Discharge     Problem: Chronic Conditions and Co-morbidities  Goal: Patient's chronic conditions and co-morbidity symptoms are monitored and maintained or improved  Outcome: Adequate for Discharge

## 2024-10-25 NOTE — PROGRESS NOTES
SPEECH LANGUAGE PATHOLOGY: ATTEMPT     NAME: Petra Head  : 1945  MRN: [id number]    ADMISSION DATE: 10/23/2024  PRIMARY DIAGNOSIS: TIA (transient ischemic attack)    Speech Therapy attempted. Patient is off the floor for CT. Will re-attempt later this date.      Regina Delcid M.S., CCC-SLP   10/24/2024 8:50 AM    
4 Eyes Skin Assessment     NAME:  Petra Head  YOB: 1945  MEDICAL RECORD NUMBER:  [id number]    The patient is being assessed for  Admission    I agree that at least one RN has performed a thorough Head to Toe Skin Assessment on the patient. ALL assessment sites listed below have been assessed.      Areas assessed by both nurses:    Head, Face, Ears, Shoulders, Back, Chest, Arms, Elbows, Hands, Sacrum. Buttock, Coccyx, Ischium, and Legs. Feet and Heels        Does the Patient have a Wound? No noted wound(s)       Edward Prevention initiated by RN: Yes  Wound Care Orders initiated by RN: No    Pressure Injury (Stage 3,4, Unstageable, DTI, NWPT, and Complex wounds) if present, place Wound referral order by RN under : No    New Ostomies, if present place, Ostomy referral order under : No     Nurse 1 eSignature: Electronically signed by MICHAEL CHAO RN on 10/23/24 at 11:20 PM EDT    **SHARE this note so that the co-signing nurse can place an eSignature**    Nurse 2 eSignature: {Esignature:[id number]}   
ACUTE OCCUPATIONAL THERAPY GOALS:   (Developed with and agreed upon by patient and/or caregiver.)  1. Patient will complete lower body bathing and dressing with MODIFIED INDEPENDENCE and adaptive equipment as needed.     2. Patient will complete toileting with INDEPENDENCE.  3. Patient will complete grooming ADL standing at sink with INDEPENDENCE.  4. Patient will tolerate 25 minutes of OT treatment with 1-2 rest breaks to increase activity tolerance for ADLs.   5. Patient will complete functional transfers with MODIFIED INDEPENDENCE and adaptive equipment as needed.   6. Patient will tolerate 10 minutes BUE exercises to increase strength for safe, functional transfers.     Timeframe: 7 visits     OCCUPATIONAL THERAPY Initial Assessment and Daily Note       OT Visit Days: 1  Acknowledge Orders  Time  OT Charge Capture  Rehab Caseload Tracker      Petra Head is a 79 y.o. female   PRIMARY DIAGNOSIS: TIA (transient ischemic attack)  TIA (transient ischemic attack) [G45.9]       Reason for Referral: Generalized Muscle Weakness (M62.81)  Difficulty in walking, Not elsewhere classified (R26.2)  Other abnormalities of gait and mobility (R26.89)  Observation: Payor: [facility] MEDICARE / Plan: [facility] MEDICARE ADVANTAGE / Product Type: *No Product type* /     ASSESSMENT:     REHAB RECOMMENDATIONS:   Recommendation to date pending progress:  Setting:  Home Health Therapy    Equipment:    To Be Determined--pt has SPC, rolling walker and SC at home     ASSESSMENT:  Ms. Head is a 78 y/o female presents with LUE weakness, facial droop, undergoing TIA workup, all s/s have resolved. At baseline pt lives with her son, is independent all ADLs (did mention it has been increasingly more difficult the past 6 months) and uses SPC/rolling walker for mobility. Today pt presents with decreased activity tolerance, balance and strength impacting ADLs. Pt overall SBA rolling walker for functional transfers and mobility of household 
Advance Care Planning     Advance Care Planning Inpatient Note  [facility] Department    Today's Date: 10/24/2024  Unit: SFD 8 MED SURG    Received request from HealthCare Provider.  Upon review of chart and communication with care team, patient's decision making abilities are not in question.. Patient, Healthcare Decision Maker, and Child/Children was/were present in the room during visit.    Goals of ACP Conversation:  Discuss advance care planning documents  Facilitate a discussion related to patient's goals of care as they align with the patient's values and beliefs.  Pt chose Elmer Head as primary and Carolina Palma as 2ndary agent.    Health Care Decision Makers:       Primary Decision Maker: Elmer Head - [place] - [phone]    Secondary Decision Maker: Carolina Palma - [phone]  Summary:  Verified Documents  Completed New Documents    Advance Care Planning Documents (Patient Wishes):  Healthcare Power of /Advance Directive Appointment of Health Care Agent     Assessment:  Son- Elmer Head- is primary agent. Daughter- Carolina Palma- is 2ndary agent.    Interventions:  Provided education on documents for clarity and greater understanding  Discussed and provided education on state decision maker hierarchy  Assisted in the completion of documents according to patient's wishes at this time  Encouraged ongoing ACP conversation with future decision makers and loved ones    Care Preferences Communicated:   No    Outcomes/Plan:  ACP Discussion: Completed  New advance directive completed.  Returned original document(s) to patient, as well as copies for distribution to appointed agents  Copy of advance directive given to staff to scan into medical record.  Teach Back Method used to verify the patient's and/or Healthcare Decision Maker's understanding of key information in the advance directive documents    Electronically signed by Chaplain JOANA on 10/24/2024 at 4:39 PM           
Assisted pt in completing ACP. Son, Elmer, is primary decision agent. Daughter, Carolina, is 2ndary decision agent.  
Discharge instructions reviewed with Pt. Opportunity for questions and clarification given. IV removed and cath tip intact. Scripts delivered to room by pharmacy. Education given to pt and pt was able to teach back. No needs at this time and pt waiting on ride to arrive.     
GOALS:  LTG: Patient will increase receptive/expressive language skills demonstrated by the ability to communicate basic wants/needs across environments.   STG:  Patient will name 10 items to given category given minimal cueing.  Patient will complete high level cognitive tasks to demonstrate independence in home environment for money management, scheduling, safety awareness, etc. with minimal verbal cues.     SPEECH LANGUAGE PATHOLOGY: COMBINED Cognitive-Communication and Dysphagia Initial Assessment    Acknowledge Order  I  Therapy Time  I   Charges     I  Rehab Caseload Tracker    NAME: Petra Head  : 1945  MRN: [id number]    ADMISSION DATE: 10/23/2024  PRIMARY DIAGNOSIS: TIA (transient ischemic attack)    ICD-10: Treatment Diagnosis:   R13.11 Dysphagia, Oral Phase  R41.841 Cognitive-Communication Deficit  F80.1 Expressive Language Disorder    RECOMMENDATIONS   Diet:    Easy to Chew + bread  Thin Liquids    Medication: as tolerated (prefers large pills in applesauce)   Compensatory Swallowing Strategies:   Slow rate of intake  Upright as possible for all oral intake   Therapeutic Intervention:   Patient/family education  Dysphagia treatment  Language treatment  Cognitive-linguistic treatment   Patient continues to require skilled intervention:  Yes. Recommend ongoing speech therapy services during this hospitalization.     Anticipated Discharge Needs:  Recommending home health speech therapy at discharge     ASSESSMENT    Dysphagia: Patient presents with functional swallow as able to be determined at bedside characterized by functional mastication, timely oral transit, and efficient swallow initiation with no overt indications of airway compromise. Patient prefers some softer items due to edentulous state and hyper reflexive gag with dentures in place. Requesting continue current diet of Easy to chew + bread, thin liquids, medications as tolerated with larger tablets in applesauce, all of which 
Orders for pt to remain NPO until swallow screen completed. Pt passed swallow screen with this RN. Per orders, this RN advanced pt diet to easy to chew, 5 carb, no added salt. Placed on easy to chew due to pt not having dentures.   
Outpatient Pharmacy Progress Note for Meds-to-Beds    Total number of Prescriptions Filled: 1    List of Medications (name,strength):  Eliquis 5mg      Delivered to: Sonido Lambert      Co-pay: 0      Payment Type:0      Additional Documentation:  Medication(s) were delivered to the patient's room prior to discharge      Thank you for letting us serve your patients.  [facility] [id number] [address]  Phone: [phone]  Fax: [phone]       
Pt assessed. Patient in fact wears CPAP at home, but doesn't know her settings. Patient stated that she does wear O2 2L HS with CPAP. Patient was offered our CPAP auto, but intently refused it. Son will bring her home equipment in tomorrow and upon being in good working condition, it will be applied to her (Per home regimen) while on admission. She will go on 2L or whatever measure is appropriate this night. O2 sat while awake at this time on room air is 97%. Plan of care ongoing.   
Shift report received from previous RN. Pt in bed. Respirations even and unlabored on room air. No signs of distress, pt denies any pain. Pt instructed to utilize call light if any assistance was needed. Pt verbalized understanding. No further needs stated by pt. Handoff NIHSS/Neuro check completed at this time.     
Stroke Education provided to patient and the following topics were discussed    1. Patients personal risk factors for stroke are atrial fibrillation, pre-diabetes     2. Warning signs of Stroke:        * Sudden numbness or weakness of the face, arm or leg, especially on one side of          The body            * Sudden confusion, trouble speaking or understanding        * Sudden trouble seeing in one or both eyes        * Sudden trouble walking, dizziness, loss of balance or coordination        * Sudden severe headache with no known cause      3. Importance of activation Emergency Medical Services ( 9-1-1 ) immediately if experience any warning signs of stroke.    4. Be sure and schedule a follow-up appointment with your primary care doctor or any specialists as instructed.     5. You must take medicine every day to treat your risk factors for stroke.  Be sure to take your medicines exactly as your doctor tells you: no more, no less.  Know what your medicines are for , what they do.  Anti-thrombotics /anticoagulants can help prevent strokes.  You are taking the following medicine(s)  Xarelto     6.  Smoking and second-hand smoke greatly increase your risk of stroke, cardiovascular disease and death. Smoking history never    7. Information provided was Stroke Handouts or Verbal Education    8. Documentation of teaching completed in Patient Education Activity and on Care Plan with teaching response noted?  yes    
TRANSFER - IN REPORT:    Verbal report received from Chary EVANS on Petra Head  being received from ED for routine progression of patient care      Report consisted of patient's Situation, Background, Assessment and   Recommendations(SBAR).     Information from the following report(s) Nurse Handoff Report, ED Encounter Summary, and ED SBAR was reviewed with the receiving nurse.    Opportunity for questions and clarification was provided.      Assessment will be completed upon patient's arrival to unit and care assumed.    
Assistance,   Min=Minimal Assistance, Mod=Moderate Assistance, Max=Maximal Assistance, Total=Total Assistance, NT=Not Tested    PLAN:   FREQUENCY AND DURATION: 3 times/week for duration of hospital stay or until stated goals are met, whichever comes first.    THERAPY PROGNOSIS: Good    PROBLEM LIST:   (Skilled intervention is medically necessary to address:)  Decreased ADL/Functional Activities  Decreased Activity Tolerance  Decreased AROM/PROM  Decreased Balance  Decreased Gait Ability  Decreased Strength  Decreased Transfer Abilities INTERVENTIONS PLANNED:   (Benefits and precautions of physical therapy have been discussed with the patient.)  Self Care Training  Therapeutic Activity  Therapeutic Exercise/HEP  Neuromuscular Re-education  Gait Training  Education         TREATMENT:   EVALUATION: LOW COMPLEXITY: (Untimed Charge)  The initial evaluation charge encompasses clinical chart review, objective assessment, interpretation of assessment, and skilled monitoring of the patient's response to treatment in order to develop a plan of care.     TREATMENT:   Therapeutic Activity (10 Minutes): Therapeutic activity included Transfer Training, Ambulation on level ground, Sitting balance , and Standing balance to improve functional Activity tolerance, Balance, Coordination, Mobility, Strength, and ROM.    TREATMENT GRID:  N/A    AFTER TREATMENT PRECAUTIONS: Bed/Chair Locked, Call light within reach, Chair, Needs within reach, and RN notified    INTERDISCIPLINARY COLLABORATION:  RN/ PCT, PT/ PTA, and OT/ DOW    EDUCATION: Education Given To: Patient  Education Provided: Role of Therapy    TIME IN/OUT:  Time In: 1020  Time Out: 1033  Minutes: 13    Abdi Franks PT   
Gap 8 (L) 9 - 18 mmol/L    Glucose 122 (H) 70 - 99 mg/dL    BUN 20 8 - 23 MG/DL    Creatinine 0.76 0.60 - 1.10 MG/DL    Est, Glom Filt Rate 81 >60 ml/min/1.73m2    Calcium 8.8 8.8 - 10.2 MG/DL   CBC    Collection Time: 10/24/24  5:20 AM   Result Value Ref Range    WBC 9.0 4.3 - 11.1 K/uL    RBC 4.48 4.05 - 5.2 M/uL    Hemoglobin 14.9 11.7 - 15.4 g/dL    Hematocrit 47.1 (H) 35.8 - 46.3 %    .1 (H) 82 - 102 FL    MCH 33.3 (H) 26.1 - 32.9 PG    MCHC 31.6 31.4 - 35.0 g/dL    RDW 14.0 11.9 - 14.6 %    Platelets 244 150 - 450 K/uL    MPV 9.4 9.4 - 12.3 FL    nRBC 0.00 0.0 - 0.2 K/uL   Lipid Panel    Collection Time: 10/24/24  5:20 AM   Result Value Ref Range    Cholesterol, Total 126 0 - 200 MG/DL    Triglycerides 128 0 - 150 MG/DL    HDL 31 (L) 40 - 60 MG/DL    LDL Cholesterol 69 0 - 100 MG/DL    VLDL Cholesterol Calculated 26 (H) 6 - 23 MG/DL    Chol/HDL Ratio 4.0 0.0 - 5.0         No results for input(s): \"COVID19\" in the last 72 hours.    Current Meds:  Current Facility-Administered Medications   Medication Dose Route Frequency    sodium chloride flush 0.9 % injection 5-40 mL  5-40 mL IntraVENous 2 times per day    sodium chloride flush 0.9 % injection 5-40 mL  5-40 mL IntraVENous PRN    0.9 % sodium chloride infusion   IntraVENous PRN    polyethylene glycol (GLYCOLAX) packet 17 g  17 g Oral Daily PRN    acetaminophen (TYLENOL) tablet 650 mg  650 mg Oral Q4H PRN    Or    acetaminophen (TYLENOL) suppository 650 mg  650 mg Rectal Q4H PRN    sennosides-docusate sodium (SENOKOT-S) 8.6-50 MG tablet 1 tablet  1 tablet Oral BID    promethazine (PHENERGAN) tablet 25 mg  25 mg Oral Q6H PRN    Or    ondansetron (ZOFRAN) injection 4 mg  4 mg IntraVENous Q4H PRN    labetalol (NORMODYNE;TRANDATE) injection 10 mg  10 mg IntraVENous Q10 Min PRN    rivaroxaban (XARELTO) tablet 20 mg  20 mg Oral Dinner    pantoprazole (PROTONIX) tablet 40 mg  40 mg Oral QAM AC    metoprolol tartrate (LOPRESSOR) tablet 12.5 mg  12.5 mg Oral BID

## 2024-10-25 NOTE — CARE COORDINATION
MSN, VALERIA:  patient to be discharged home today with [facility].  Patient and family agree with this discharge plan.  Patient has met all milestones for this admission.  Family to transport patient home.       10/25/24 0840   Service Assessment   Patient Orientation Alert and Oriented   Cognition Alert   Services At/After Discharge   Transition of Care Consult (CM Consult) Home Health;Other  ([facility])   Internal Home Health No   Reason Outside Agency Chosen Script used patient chose alternate agency   Services At/After Discharge PT;OT;Nursing services   Mode of Transport at Discharge Other (see comment)  (family to transport)   Confirm Follow Up Transport Family   Condition of Participation: Discharge Planning   The Plan for Transition of Care is related to the following treatment goals: Home Health and Palliative Care   The Patient and/or Patient Representative was provided with a Choice of Provider? Patient;Patient Representative   Name of the Patient Representative who was provided with the Choice of Provider and agrees with the Discharge Plan?  Son   The Patient and/Or Patient Representative agree with the Discharge Plan? Yes   Freedom of Choice list was provided with basic dialogue that supports the patient's individualized plan of care/goals, treatment preferences, and shares the quality data associated with the providers?  Yes

## 2024-10-25 NOTE — DISCHARGE SUMMARY
SERVICES  IP CONSULT TO RESPIRATORY CARE    Consultants will arrange their own follow ups, if applicable.    Echocardiogram results:  03/15/24    ECHO (TTE) COMPLETE (PRN CONTRAST/BUBBLE/STRAIN/3D) 03/18/2024  9:19 AM (Final)    Interpretation Summary    Left Ventricle: Normal left ventricular systolic function with a visually estimated EF of 55 - 60%. Left ventricle is smaller than normal. Mildly increased wall thickness. Findings consistent with concentric remodeling. Normal wall motion. Abnormal diastolic function.    Aortic Valve: Not well visualized. Mild sclerosis of the aortic valve cusp. Mild regurgitation.    Mitral Valve: Mild regurgitation.    Left Atrium: Left atrium is severely dilated. Left atrial volume index is severely increased (>48 mL/m2).    Right Atrium: Right atrium is dilated.    Image quality is fair. Technically difficult study due to patient's body habitus.    Signed by: Denis Warren DO on 3/18/2024  9:19 AM      Diagnostic Imaging/Tests:   MRI brain without contrast    Result Date: 10/24/2024  1. Small acute lacunar infarct of the right centrum semiovale with no associated hemorrhage, gross edema or mass effect. Findings messaged to Dr. Hernandez At 4:38 p.m. Eastern time. 2. Chronic bilateral cerebral periventricular and deep white matter microangiopathic changes with chronic white matter lacunar infarcts. Electronically signed by Rupali Martinez    CTA HEAD NECK W CONTRAST    Result Date: 10/24/2024  No evidence of large vessel occlusion. No significant ICA stenosis Electronically signed by VINICIO YOUNGBLOOD    CT HEAD WO CONTRAST    Result Date: 10/23/2024  1. Senescent changes. 2. Low-attenuation changes likely representing chronic microvascular angiopathic ischemic changes. No acute intracranial hemorrhage. Electronically signed by [employer]    XR CHEST PORTABLE    Result Date: 10/23/2024  No acute findings in the chest Electronically signed by [employer] CHEST (2

## 2024-10-28 ENCOUNTER — TELEPHONE (OUTPATIENT)
Age: 79
End: 2024-10-28

## 2024-10-28 NOTE — TELEPHONE ENCOUNTER
Keiry, nurse with [facility] called stating she needs the following :    Will Dr Alvares sign to follow home health    Please call and advise.

## 2024-10-29 NOTE — TELEPHONE ENCOUNTER
Luz Goldberg MD  You2 hours ago (7:40 AM)     BM  ?  Probably should be her PCP-she had a TIA not cardiac admission, doesn't look like cardiology was involved unless I've missed it.       Informed Keiry of response. Voiced understanding.

## 2024-10-31 ENCOUNTER — OFFICE VISIT (OUTPATIENT)
Dept: NEUROLOGY | Age: 79
End: 2024-10-31

## 2024-10-31 VITALS
HEIGHT: 64 IN | HEART RATE: 73 BPM | OXYGEN SATURATION: 99 % | DIASTOLIC BLOOD PRESSURE: 74 MMHG | SYSTOLIC BLOOD PRESSURE: 126 MMHG | WEIGHT: 240 LBS | BODY MASS INDEX: 40.97 KG/M2

## 2024-10-31 DIAGNOSIS — E11.51 CONTROLLED TYPE 2 DM WITH PERIPHERAL CIRCULATORY DISORDER (HCC): ICD-10-CM

## 2024-10-31 DIAGNOSIS — R41.3 MEMORY LOSS: ICD-10-CM

## 2024-10-31 DIAGNOSIS — R60.0 BILATERAL LOWER EXTREMITY EDEMA: ICD-10-CM

## 2024-10-31 DIAGNOSIS — I69.354 HEMIPARESIS AFFECTING LEFT SIDE AS LATE EFFECT OF CEREBROVASCULAR ACCIDENT (CVA) (HCC): ICD-10-CM

## 2024-10-31 DIAGNOSIS — F32.A ANXIETY AND DEPRESSION: ICD-10-CM

## 2024-10-31 DIAGNOSIS — R20.2 PARESTHESIA OF BOTH LOWER EXTREMITIES: ICD-10-CM

## 2024-10-31 DIAGNOSIS — F41.9 ANXIETY AND DEPRESSION: ICD-10-CM

## 2024-10-31 DIAGNOSIS — Z09 HOSPITAL DISCHARGE FOLLOW-UP: Primary | ICD-10-CM

## 2024-10-31 DIAGNOSIS — Z95.0 PACEMAKER: ICD-10-CM

## 2024-10-31 DIAGNOSIS — I63.81 LACUNAR INFARCTION (HCC): ICD-10-CM

## 2024-10-31 DIAGNOSIS — I10 HTN, GOAL BELOW 130/80: ICD-10-CM

## 2024-10-31 DIAGNOSIS — I48.21 PERMANENT ATRIAL FIBRILLATION (HCC): ICD-10-CM

## 2024-10-31 DIAGNOSIS — G47.33 OBSTRUCTIVE SLEEP APNEA: ICD-10-CM

## 2024-10-31 PROBLEM — R73.03 PREDIABETES: Status: RESOLVED | Noted: 2024-04-05 | Resolved: 2024-10-31

## 2024-10-31 RX ORDER — GABAPENTIN 250 MG/5ML
250 SOLUTION ORAL 2 TIMES DAILY
Qty: 300 ML | Refills: 2 | Status: SHIPPED | OUTPATIENT
Start: 2024-10-31 | End: 2025-01-29

## 2024-10-31 RX ORDER — GABAPENTIN 300 MG/1
300 CAPSULE ORAL 2 TIMES DAILY
Qty: 180 CAPSULE | Refills: 1 | Status: SHIPPED | OUTPATIENT
Start: 2024-10-31 | End: 2024-10-31 | Stop reason: ALTCHOICE

## 2024-10-31 RX ORDER — TOPIRAMATE 25 MG/1
50 TABLET, FILM COATED ORAL DAILY
COMMUNITY
Start: 2024-10-28

## 2024-10-31 ASSESSMENT — PATIENT HEALTH QUESTIONNAIRE - PHQ9
2. FEELING DOWN, DEPRESSED OR HOPELESS: NOT AT ALL
SUM OF ALL RESPONSES TO PHQ QUESTIONS 1-9: 0
1. LITTLE INTEREST OR PLEASURE IN DOING THINGS: NOT AT ALL
SUM OF ALL RESPONSES TO PHQ QUESTIONS 1-9: 0
SUM OF ALL RESPONSES TO PHQ9 QUESTIONS 1 & 2: 0
SUM OF ALL RESPONSES TO PHQ QUESTIONS 1-9: 0
SUM OF ALL RESPONSES TO PHQ QUESTIONS 1-9: 0

## 2024-10-31 ASSESSMENT — ENCOUNTER SYMPTOMS
GASTROINTESTINAL NEGATIVE: 1
EYES NEGATIVE: 1
RESPIRATORY NEGATIVE: 1
ALLERGIC/IMMUNOLOGIC NEGATIVE: 1

## 2024-10-31 NOTE — PROGRESS NOTES
severe dementia memantine can be added.   Cognitive enrichment by taking part in social activities and support groups.   For delirium and sundowning: cholinesterase inhibitor and melatonin can be helpful. For the prevention of delirium, re-orientation, daily routine, a normal sleep wake cycle, and socialization are most important. Benzodiazepines and anticholinergic medications should be avoided as these are frequent causes of delirium. If all measures have failed and the patient is a danger to self or others then certain atypical antipsychotics can be used as low doses (quetiapine 25 mg at night).   Depression: depression is very common in dementia. SSRIs are preferred (i.e. Citalopram,escitalopram, etc.). If depression coexists with apathy then I recommend bupropion.  Sleep: I counseled the patient on sleep hygiene, including keeping the lights off at night, TV off, and the benefits of herbal tea, etc. Melatonin, trazodone, and mirtazapine are helpful medications. I recommend against the use of sedative hypnotics.   Fatigue: amantadine and modafinil may be mildly effective.   Agitation and aggression: attempt to resolve with communication. Always introduce the patient to everyone in the room and make eye contact with the patient. Use a calm and friendly voice. Certain SSRIs may be helpful     Prognosis: pneumonia and stroke are common causes of death in dementia. The rate of cognitive decline is about the same [id number]. The duration of life after diagnosis varies significantly between patients.     Permanent atrial fibrillation (HCC)  Followed by cardiology.  Continue Eliquis 5 mg twice daily.  Rate controlled with beta-blocker.  HTN, goal below 130/80  Stable.  Continue metoprolol  Recommend keeping blood pressure and heart rate log  Controlled type 2 DM with peripheral circulatory disorder (HCC)  Last A1c 6.7, at goal of less than 7  Diet and exercise discussed    Pacemaker    Obstructive sleep apnea  Unable

## 2024-11-08 ENCOUNTER — OFFICE VISIT (OUTPATIENT)
Dept: UROLOGY | Age: 79
End: 2024-11-08

## 2024-11-08 DIAGNOSIS — N39.41 URGE INCONTINENCE: ICD-10-CM

## 2024-11-08 DIAGNOSIS — N39.0 RECURRENT UTI: Primary | ICD-10-CM

## 2024-11-08 DIAGNOSIS — N32.81 OVERACTIVE BLADDER: ICD-10-CM

## 2024-11-08 LAB
BILIRUBIN, URINE, POC: NEGATIVE
BLOOD URINE, POC: ABNORMAL
GLUCOSE URINE, POC: NEGATIVE MG/DL
KETONES, URINE, POC: NEGATIVE MG/DL
LEUKOCYTE ESTERASE, URINE, POC: ABNORMAL
NITRITE, URINE, POC: POSITIVE
PH, URINE, POC: 5.5 (ref 4.6–8)
PROTEIN,URINE, POC: ABNORMAL MG/DL
SPECIFIC GRAVITY, URINE, POC: 1.02 (ref 1–1.03)
URINALYSIS CLARITY, POC: ABNORMAL
URINALYSIS COLOR, POC: ABNORMAL
UROBILINOGEN, POC: ABNORMAL MG/DL

## 2024-11-08 RX ORDER — MIRABEGRON 50 MG/1
50 TABLET, FILM COATED, EXTENDED RELEASE ORAL DAILY
Qty: 90 TABLET | Refills: 3 | Status: SHIPPED | OUTPATIENT
Start: 2024-11-08

## 2024-11-08 ASSESSMENT — ENCOUNTER SYMPTOMS: BACK PAIN: 0

## 2024-11-08 NOTE — PROGRESS NOTES
MCG/ML injection INJECT 1 ML IN THE MUSCLE TWICE MONTHLY      folic acid (FOLVITE) 800 MCG tablet Take 1 tablet by mouth daily      nystatin (MYCOSTATIN) 532825 UNIT/ML suspension Take 5 mLs by mouth 4 times daily      venlafaxine (EFFEXOR) 100 MG tablet Take 1.5 tablets by mouth 2 times daily 1 AND 1/2 TS PO BID       No current facility-administered medications for this visit.     Allergies   Allergen Reactions    Bupropion Other (See Comments)    Erythromycin Other (See Comments)    Ibuprofen Other (See Comments)     Swelling    Levofloxacin Other (See Comments)    Clarithromycin Rash    Doxycycline Rash    Talc Rash    Zinc Rash     Social History     Socioeconomic History    Marital status: Single     Spouse name: Not on file    Number of children: Not on file    Years of education: Not on file    Highest education level: Not on file   Occupational History    Not on file   Tobacco Use    Smoking status: Never    Smokeless tobacco: Never   Vaping Use    Vaping status: Never Used   Substance and Sexual Activity    Alcohol use: Yes     Comment: rare    Drug use: Not Currently     Comment: previous THC gummies in [place]    Sexual activity: Not Currently   Other Topics Concern    Not on file   Social History Narrative    Not on file     Social Determinants of Health     Financial Resource Strain: Not on file   Food Insecurity: No Food Insecurity (10/23/2024)    Hunger Vital Sign     Worried About Running Out of Food in the Last Year: Never true     Ran Out of Food in the Last Year: Never true   Transportation Needs: No Transportation Needs (10/23/2024)    PRAPARE - Transportation     Lack of Transportation (Medical): No     Lack of Transportation (Non-Medical): No   Physical Activity: Not on file (3/24/2022)   Stress: Not on file   Social Connections: Unknown (3/19/2021)    Received from [facility]    Social Connections     Frequency of Communication with Friends and Family: Not asked

## 2024-11-11 ENCOUNTER — TELEMEDICINE (OUTPATIENT)
Dept: SLEEP MEDICINE | Age: 79
End: 2024-11-11
Payer: MEDICARE

## 2024-11-11 DIAGNOSIS — G47.10 HYPERSOMNIA: ICD-10-CM

## 2024-11-11 DIAGNOSIS — G47.33 OSA (OBSTRUCTIVE SLEEP APNEA): Primary | ICD-10-CM

## 2024-11-11 DIAGNOSIS — G47.34 NOCTURNAL HYPOXEMIA: ICD-10-CM

## 2024-11-11 PROCEDURE — 1090F PRES/ABSN URINE INCON ASSESS: CPT | Performed by: PHYSICIAN ASSISTANT

## 2024-11-11 PROCEDURE — 99214 OFFICE O/P EST MOD 30 MIN: CPT | Performed by: PHYSICIAN ASSISTANT

## 2024-11-11 PROCEDURE — 1123F ACP DISCUSS/DSCN MKR DOCD: CPT | Performed by: PHYSICIAN ASSISTANT

## 2024-11-11 PROCEDURE — 1111F DSCHRG MED/CURRENT MED MERGE: CPT | Performed by: PHYSICIAN ASSISTANT

## 2024-11-11 PROCEDURE — G8400 PT W/DXA NO RESULTS DOC: HCPCS | Performed by: PHYSICIAN ASSISTANT

## 2024-11-11 PROCEDURE — 1160F RVW MEDS BY RX/DR IN RCRD: CPT | Performed by: PHYSICIAN ASSISTANT

## 2024-11-11 PROCEDURE — 1159F MED LIST DOCD IN RCRD: CPT | Performed by: PHYSICIAN ASSISTANT

## 2024-11-11 PROCEDURE — G8427 DOCREV CUR MEDS BY ELIG CLIN: HCPCS | Performed by: PHYSICIAN ASSISTANT

## 2024-11-11 ASSESSMENT — SLEEP AND FATIGUE QUESTIONNAIRES
HOW LIKELY ARE YOU TO NOD OFF OR FALL ASLEEP WHILE SITTING AND TALKING TO SOMEONE: WOULD NEVER DOZE
HOW LIKELY ARE YOU TO NOD OFF OR FALL ASLEEP IN A CAR, WHILE STOPPED FOR A FEW MINUTES IN TRAFFIC: WOULD NEVER DOZE
HOW LIKELY ARE YOU TO NOD OFF OR FALL ASLEEP WHILE SITTING INACTIVE IN A PUBLIC PLACE: SLIGHT CHANCE OF DOZING
HOW LIKELY ARE YOU TO NOD OFF OR FALL ASLEEP WHILE SITTING QUIETLY AFTER LUNCH WITHOUT ALCOHOL: WOULD NEVER DOZE
HOW LIKELY ARE YOU TO NOD OFF OR FALL ASLEEP WHILE SITTING AND READING: MODERATE CHANCE OF DOZING
HOW LIKELY ARE YOU TO NOD OFF OR FALL ASLEEP WHILE WATCHING TV: SLIGHT CHANCE OF DOZING
ESS TOTAL SCORE: 9
HOW LIKELY ARE YOU TO NOD OFF OR FALL ASLEEP WHEN YOU ARE A PASSENGER IN A CAR FOR AN HOUR WITHOUT A BREAK: MODERATE CHANCE OF DOZING
HOW LIKELY ARE YOU TO NOD OFF OR FALL ASLEEP WHILE LYING DOWN TO REST IN THE AFTERNOON WHEN CIRCUMSTANCES PERMIT: HIGH CHANCE OF DOZING

## 2024-11-11 NOTE — PATIENT INSTRUCTIONS
The company who will be taking care of your CPAP supplies is:    Address: [address], [place] [address]  Phone: [phone] Option #1  Fax: [phone]

## 2024-11-11 NOTE — PROGRESS NOTES
[facility]  [address]  [place] [address] [phone]    Patient Name:  Petra Head  YOB: 1945    Petra Head,  was evaluated through a synchronous (real-time) audio-video encounter. The patient (or guardian if applicable) is aware that this is a billable service, which includes applicable co-pays. This Virtual Visit was conducted with patient's (and/or legal guardian's) consent. Patient identification was verified, and a caregiver was present when appropriate.   The patient was located at Home: [address] [place] [address]  Provider was located at Facility (Appt Dept): [address]  [place] [address]  Confirm you are appropriately licensed, registered, or certified to deliver care in the state where the patient is located as indicated above. If you are not or unsure, please re-schedule the visit: Yes, I confirm.          --DEMIAN Heath on 11/10/2024 at 7:23 PM          Office Visit 11/11/2024    CHIEF COMPLAINT:    Chief Complaint   Patient presents with    Sleep Apnea    Follow-up         HISTORY OF PRESENT ILLNESS:  Patient is an 79 y.o. female seen today for follow up of JEFF. Pt had a PSG/HST on 12/19/14 with an AHI of 54/hr with desaturations to 69%. Pt is on CPAP 15 cm H2O with 2L bled in.    Pt was in the hospital recently for ischemic CVA 10/23-10/25. She reports that when she was in the hospital a respiratory therapist helped her and the mask leaked terribly. She reports that since she got her mask, she has had all of her teeth extracted. She knows that this has contributed to her mask not fitting well.   She has been with [employer] and she was only sent a mask. She needs a mask fit in person. Since her recent stroke, she is not driving. She needs to change DME companies and needs to have the company do a home mask fit appt.   Pt has not used her CPAP more than 1 and a half hours per day since

## 2024-11-15 ENCOUNTER — TELEPHONE (OUTPATIENT)
Dept: FAMILY MEDICINE CLINIC | Facility: CLINIC | Age: 79
End: 2024-11-15

## 2024-11-20 SDOH — HEALTH STABILITY: PHYSICAL HEALTH: ON AVERAGE, HOW MANY MINUTES DO YOU ENGAGE IN EXERCISE AT THIS LEVEL?: 10 MIN

## 2024-11-20 SDOH — HEALTH STABILITY: PHYSICAL HEALTH: ON AVERAGE, HOW MANY DAYS PER WEEK DO YOU ENGAGE IN MODERATE TO STRENUOUS EXERCISE (LIKE A BRISK WALK)?: 2 DAYS

## 2024-11-21 ENCOUNTER — TELEPHONE (OUTPATIENT)
Age: 79
End: 2024-11-21

## 2024-11-21 NOTE — TELEPHONE ENCOUNTER
Pt was switched to eliquis 5 mg BID. Pt is asking if Dr. Alvares is okay refilling this medication.

## 2024-11-22 ENCOUNTER — OFFICE VISIT (OUTPATIENT)
Dept: INTERNAL MEDICINE CLINIC | Facility: CLINIC | Age: 79
End: 2024-11-22

## 2024-11-22 VITALS
HEART RATE: 72 BPM | TEMPERATURE: 97.6 F | SYSTOLIC BLOOD PRESSURE: 114 MMHG | RESPIRATION RATE: 18 BRPM | WEIGHT: 231.2 LBS | HEIGHT: 64 IN | OXYGEN SATURATION: 96 % | DIASTOLIC BLOOD PRESSURE: 64 MMHG | BODY MASS INDEX: 39.47 KG/M2

## 2024-11-22 DIAGNOSIS — I42.8 NONISCHEMIC CARDIOMYOPATHY (HCC): ICD-10-CM

## 2024-11-22 DIAGNOSIS — Z78.0 POSTMENOPAUSAL: ICD-10-CM

## 2024-11-22 DIAGNOSIS — M81.0 OSTEOPOROSIS, UNSPECIFIED OSTEOPOROSIS TYPE, UNSPECIFIED PATHOLOGICAL FRACTURE PRESENCE: ICD-10-CM

## 2024-11-22 DIAGNOSIS — Z79.01 CHRONIC ANTICOAGULATION: ICD-10-CM

## 2024-11-22 DIAGNOSIS — E11.59 TYPE 2 DIABETES MELLITUS WITH OTHER CIRCULATORY COMPLICATION, WITHOUT LONG-TERM CURRENT USE OF INSULIN (HCC): Primary | ICD-10-CM

## 2024-11-22 DIAGNOSIS — D68.69 SECONDARY HYPERCOAGULABLE STATE (HCC): ICD-10-CM

## 2024-11-22 DIAGNOSIS — K27.9 PUD (PEPTIC ULCER DISEASE): ICD-10-CM

## 2024-11-22 DIAGNOSIS — Z95.0 PACEMAKER: ICD-10-CM

## 2024-11-22 DIAGNOSIS — I69.30 SEQUELA OF LACUNAR INFARCTION: ICD-10-CM

## 2024-11-22 DIAGNOSIS — N39.0 CHRONIC UTI: ICD-10-CM

## 2024-11-22 DIAGNOSIS — M79.672 BILATERAL FOOT PAIN: ICD-10-CM

## 2024-11-22 DIAGNOSIS — N39.41 URGE INCONTINENCE OF URINE: ICD-10-CM

## 2024-11-22 DIAGNOSIS — F32.A DEPRESSION, UNSPECIFIED DEPRESSION TYPE: ICD-10-CM

## 2024-11-22 DIAGNOSIS — E66.01 MORBID OBESITY: ICD-10-CM

## 2024-11-22 DIAGNOSIS — I50.32 CHRONIC HEART FAILURE WITH PRESERVED EJECTION FRACTION (HFPEF) (HCC): ICD-10-CM

## 2024-11-22 DIAGNOSIS — I48.21 PERMANENT ATRIAL FIBRILLATION (HCC): ICD-10-CM

## 2024-11-22 DIAGNOSIS — G47.33 OBSTRUCTIVE SLEEP APNEA: ICD-10-CM

## 2024-11-22 DIAGNOSIS — M79.671 BILATERAL FOOT PAIN: ICD-10-CM

## 2024-11-22 DIAGNOSIS — R26.89 BALANCE DISORDER: ICD-10-CM

## 2024-11-22 DIAGNOSIS — F42.2 MIXED OBSESSIONAL THOUGHTS AND ACTS: ICD-10-CM

## 2024-11-22 DIAGNOSIS — F03.A3 MILD DEMENTIA WITH MOOD DISTURBANCE, UNSPECIFIED DEMENTIA TYPE (HCC): ICD-10-CM

## 2024-11-22 PROBLEM — I63.9 ACUTE ISCHEMIC STROKE (HCC): Status: RESOLVED | Noted: 2024-10-23 | Resolved: 2024-11-22

## 2024-11-22 PROBLEM — F03.A0 MILD DEMENTIA (HCC): Status: ACTIVE | Noted: 2024-11-22

## 2024-11-22 RX ORDER — DONEPEZIL HYDROCHLORIDE 10 MG/1
10 TABLET, FILM COATED ORAL NIGHTLY
Qty: 90 TABLET | Refills: 3 | Status: SHIPPED | OUTPATIENT
Start: 2024-11-22

## 2024-11-22 RX ORDER — OMEPRAZOLE 40 MG/1
40 CAPSULE, DELAYED RELEASE ORAL 2 TIMES DAILY
Qty: 180 CAPSULE | Refills: 3 | Status: SHIPPED | OUTPATIENT
Start: 2024-11-22

## 2024-11-22 RX ORDER — VENLAFAXINE 100 MG/1
150 TABLET ORAL 2 TIMES DAILY
Qty: 90 TABLET | Refills: 3 | Status: CANCELLED | OUTPATIENT
Start: 2024-11-22

## 2024-11-22 RX ORDER — METFORMIN HYDROCHLORIDE 500 MG/1
500 TABLET, EXTENDED RELEASE ORAL
Qty: 90 TABLET | Refills: 1 | Status: SHIPPED | OUTPATIENT
Start: 2024-11-22

## 2024-11-22 RX ORDER — COLCHICINE 0.6 MG/1
0.6 TABLET ORAL DAILY
Qty: 30 TABLET | Refills: 3 | Status: SHIPPED | OUTPATIENT
Start: 2024-11-22

## 2024-11-22 SDOH — ECONOMIC STABILITY: INCOME INSECURITY: HOW HARD IS IT FOR YOU TO PAY FOR THE VERY BASICS LIKE FOOD, HOUSING, MEDICAL CARE, AND HEATING?: NOT HARD AT ALL

## 2024-11-22 SDOH — ECONOMIC STABILITY: FOOD INSECURITY: WITHIN THE PAST 12 MONTHS, THE FOOD YOU BOUGHT JUST DIDN'T LAST AND YOU DIDN'T HAVE MONEY TO GET MORE.: NEVER TRUE

## 2024-11-22 SDOH — ECONOMIC STABILITY: FOOD INSECURITY: WITHIN THE PAST 12 MONTHS, YOU WORRIED THAT YOUR FOOD WOULD RUN OUT BEFORE YOU GOT MONEY TO BUY MORE.: NEVER TRUE

## 2024-11-22 NOTE — TELEPHONE ENCOUNTER
Requested Prescriptions     Signed Prescriptions Disp Refills    apixaban (ELIQUIS) 5 MG TABS tablet 180 tablet 3     Sig: Take 1 tablet by mouth 2 times daily     Authorizing Provider: ISH SALEH     Ordering User: KUSUM MADISON

## 2024-11-22 NOTE — PROGRESS NOTES
fibrillation (HCC)  6. Pacemaker  7. Morbid obesity  8. Obstructive sleep apnea  Overview:  She is on CPAP, but here mask doesn't fit right because her teeth were extracted. She is getting a new mask.  Assessment & Plan:   Chronic, at goal (stable), continue current treatment plan  9. Osteoporosis, unspecified osteoporosis type, unspecified pathological fracture presence  Overview:  Last bone density test at [facility] in 11/2023  - She has been on fosamax  Assessment & Plan:   Chronic, at goal (stable), continue current treatment plan  Orders:  -     Vitamin D 25 Hydroxy; Future  10. Chronic anticoagulation  11. Secondary hypercoagulable state (HCC)  Overview:  Afib history  12. Depression, unspecified depression type  Overview:  She will be seeing a psychiatrist for anxiety/depression on 11/27/24 to establish care  Assessment & Plan:   Chronic, worsening (exacerbation), continue current treatment plan  13. Bilateral foot pain  Overview:  She has been having pain in the feet for the past 3-4 weeks.  She was placed on gabapentin by neurology, but reports it helped some, but was very sedating.  Assessment & Plan:   Chronic, worsening (exacerbation), continue current treatment plan  Orders:  -     Uric Acid; Future  -     XR FOOT LEFT (MIN 3 VIEWS); Future  -     XR FOOT RIGHT (MIN 3 VIEWS); Future  -     C-Reactive Protein; Future  -     colchicine (COLCRYS) 0.6 MG tablet; Take 1 tablet by mouth daily, Disp-30 tablet, R-3Normal  14. PUD (peptic ulcer disease)  Overview:  She reports PUD and was told by GI to be on Omeprazole 40 mg BID for life.  Assessment & Plan:   Chronic, at goal (stable), continue current treatment plan  Orders:  -     omeprazole (PRILOSEC) 40 MG delayed release capsule; Take 1 capsule by mouth 2 times daily, Disp-180 capsule, R-3Normal  15. Chronic UTI  Overview:  She is on nitrofurantion- chronic  - she follows with urology  Orders:  -     Urinalysis with Reflex to Culture; Future  16. Urge

## 2024-11-26 DIAGNOSIS — N39.0 CHRONIC UTI: ICD-10-CM

## 2024-11-26 DIAGNOSIS — E11.59 TYPE 2 DIABETES MELLITUS WITH OTHER CIRCULATORY COMPLICATION, WITHOUT LONG-TERM CURRENT USE OF INSULIN (HCC): ICD-10-CM

## 2024-11-26 DIAGNOSIS — Z78.0 POSTMENOPAUSAL: ICD-10-CM

## 2024-11-26 DIAGNOSIS — I42.8 NONISCHEMIC CARDIOMYOPATHY (HCC): ICD-10-CM

## 2024-11-26 DIAGNOSIS — M79.671 BILATERAL FOOT PAIN: ICD-10-CM

## 2024-11-26 DIAGNOSIS — M79.672 BILATERAL FOOT PAIN: ICD-10-CM

## 2024-11-26 DIAGNOSIS — R26.89 BALANCE DISORDER: ICD-10-CM

## 2024-11-26 DIAGNOSIS — M81.0 OSTEOPOROSIS, UNSPECIFIED OSTEOPOROSIS TYPE, UNSPECIFIED PATHOLOGICAL FRACTURE PRESENCE: ICD-10-CM

## 2024-11-26 DIAGNOSIS — I50.32 CHRONIC HEART FAILURE WITH PRESERVED EJECTION FRACTION (HFPEF) (HCC): ICD-10-CM

## 2024-11-26 LAB
25(OH)D3 SERPL-MCNC: 52 NG/ML (ref 30–100)
ALBUMIN SERPL-MCNC: 3.3 G/DL (ref 3.2–4.6)
ALBUMIN/GLOB SERPL: 0.9 (ref 1–1.9)
ALP SERPL-CCNC: 91 U/L (ref 35–104)
ALT SERPL-CCNC: 13 U/L (ref 8–45)
ANION GAP SERPL CALC-SCNC: 8 MMOL/L (ref 7–16)
APPEARANCE UR: ABNORMAL
AST SERPL-CCNC: 25 U/L (ref 15–37)
BACTERIA URNS QL MICRO: ABNORMAL /HPF
BASOPHILS # BLD: 0.1 K/UL (ref 0–0.2)
BASOPHILS NFR BLD: 1 % (ref 0–2)
BILIRUB SERPL-MCNC: 0.4 MG/DL (ref 0–1.2)
BILIRUB UR QL: NEGATIVE
BUN SERPL-MCNC: 24 MG/DL (ref 8–23)
CALCIUM SERPL-MCNC: 9.7 MG/DL (ref 8.8–10.2)
CASTS URNS QL MICRO: ABNORMAL /LPF
CHLORIDE SERPL-SCNC: 106 MMOL/L (ref 98–107)
CO2 SERPL-SCNC: 29 MMOL/L (ref 20–29)
COLOR UR: ABNORMAL
CREAT SERPL-MCNC: 0.85 MG/DL (ref 0.6–1.1)
CREAT UR-MCNC: 191 MG/DL (ref 28–217)
CRP SERPL-MCNC: 1.2 MG/DL (ref 0–0.4)
CRYSTALS URNS QL MICRO: ABNORMAL /LPF
DIFFERENTIAL METHOD BLD: ABNORMAL
EOSINOPHIL # BLD: 0.2 K/UL (ref 0–0.8)
EOSINOPHIL NFR BLD: 2 % (ref 0.5–7.8)
EPI CELLS #/AREA URNS HPF: ABNORMAL /HPF
ERYTHROCYTE [DISTWIDTH] IN BLOOD BY AUTOMATED COUNT: 14.5 % (ref 11.9–14.6)
GLOBULIN SER CALC-MCNC: 3.5 G/DL (ref 2.3–3.5)
GLUCOSE SERPL-MCNC: 127 MG/DL (ref 70–99)
GLUCOSE UR STRIP.AUTO-MCNC: NEGATIVE MG/DL
HCT VFR BLD AUTO: 43.8 % (ref 35.8–46.3)
HGB BLD-MCNC: 14.1 G/DL (ref 11.7–15.4)
HGB UR QL STRIP: NEGATIVE
IMM GRANULOCYTES # BLD AUTO: 0 K/UL (ref 0–0.5)
IMM GRANULOCYTES NFR BLD AUTO: 0 % (ref 0–5)
KETONES UR QL STRIP.AUTO: ABNORMAL MG/DL
LEUKOCYTE ESTERASE UR QL STRIP.AUTO: ABNORMAL
LYMPHOCYTES # BLD: 1.9 K/UL (ref 0.5–4.6)
LYMPHOCYTES NFR BLD: 26 % (ref 13–44)
MCH RBC QN AUTO: 33.3 PG (ref 26.1–32.9)
MCHC RBC AUTO-ENTMCNC: 32.2 G/DL (ref 31.4–35)
MCV RBC AUTO: 103.3 FL (ref 82–102)
MICROALBUMIN UR-MCNC: 2.45 MG/DL (ref 0–20)
MICROALBUMIN/CREAT UR-RTO: 13 MG/G (ref 0–30)
MONOCYTES # BLD: 0.6 K/UL (ref 0.1–1.3)
MONOCYTES NFR BLD: 9 % (ref 4–12)
MUCOUS THREADS URNS QL MICRO: 0 /LPF
NEUTS SEG # BLD: 4.6 K/UL (ref 1.7–8.2)
NEUTS SEG NFR BLD: 62 % (ref 43–78)
NITRITE UR QL STRIP.AUTO: NEGATIVE
NRBC # BLD: 0 K/UL (ref 0–0.2)
NT PRO BNP: 1076 PG/ML (ref 0–450)
OTHER OBSERVATIONS: ABNORMAL
PH UR STRIP: 7 (ref 5–9)
PLATELET # BLD AUTO: 275 K/UL (ref 150–450)
PMV BLD AUTO: 10.7 FL (ref 9.4–12.3)
POTASSIUM SERPL-SCNC: 4.7 MMOL/L (ref 3.5–5.1)
PROT SERPL-MCNC: 6.7 G/DL (ref 6.3–8.2)
PROT UR STRIP-MCNC: ABNORMAL MG/DL
RBC # BLD AUTO: 4.24 M/UL (ref 4.05–5.2)
RBC #/AREA URNS HPF: ABNORMAL /HPF
SODIUM SERPL-SCNC: 143 MMOL/L (ref 136–145)
SP GR UR REFRACTOMETRY: 1.02 (ref 1–1.02)
TSH W FREE THYROID IF ABNORMAL: 1.24 UIU/ML (ref 0.27–4.2)
URATE SERPL-MCNC: 6.4 MG/DL (ref 2.5–7.1)
URINE CULTURE IF INDICATED: ABNORMAL
UROBILINOGEN UR QL STRIP.AUTO: 1 EU/DL (ref 0.2–1)
VIT B12 SERPL-MCNC: 864 PG/ML (ref 193–986)
WBC # BLD AUTO: 7.3 K/UL (ref 4.3–11.1)
WBC URNS QL MICRO: ABNORMAL /HPF

## 2024-11-27 ENCOUNTER — TELEMEDICINE (OUTPATIENT)
Dept: BEHAVIORAL/MENTAL HEALTH CLINIC | Facility: CLINIC | Age: 79
End: 2024-11-27
Payer: MEDICARE

## 2024-11-27 DIAGNOSIS — G47.00 INSOMNIA, UNSPECIFIED TYPE: ICD-10-CM

## 2024-11-27 DIAGNOSIS — F33.1 MAJOR DEPRESSIVE DISORDER, RECURRENT, MODERATE (HCC): Primary | ICD-10-CM

## 2024-11-27 DIAGNOSIS — F41.9 ANXIETY DISORDER, UNSPECIFIED TYPE: ICD-10-CM

## 2024-11-27 DIAGNOSIS — R41.89 COGNITIVE IMPAIRMENT: ICD-10-CM

## 2024-11-27 PROCEDURE — 1036F TOBACCO NON-USER: CPT | Performed by: PSYCHIATRY & NEUROLOGY

## 2024-11-27 PROCEDURE — 90792 PSYCH DIAG EVAL W/MED SRVCS: CPT | Performed by: PSYCHIATRY & NEUROLOGY

## 2024-11-27 RX ORDER — TRAZODONE HYDROCHLORIDE 50 MG/1
25-50 TABLET, FILM COATED ORAL NIGHTLY
Qty: 30 TABLET | Refills: 1 | Status: SHIPPED | OUTPATIENT
Start: 2024-11-27

## 2024-11-27 RX ORDER — TOPIRAMATE 25 MG/1
25 TABLET, FILM COATED ORAL DAILY
Qty: 30 TABLET | Refills: 1 | Status: SHIPPED | OUTPATIENT
Start: 2024-11-27

## 2024-11-27 RX ORDER — VENLAFAXINE 75 MG/1
150 TABLET ORAL 2 TIMES DAILY
Qty: 120 TABLET | Refills: 1 | Status: SHIPPED | OUTPATIENT
Start: 2024-11-27

## 2024-11-27 NOTE — PROGRESS NOTES
TRIG 128 10/24/2024    HDL 31 (L) 10/24/2024    LDL 69 10/24/2024    CHOL 126 10/24/2024    GLUCOSE 127 (H) 11/26/2024     Lab Results   Component Value Date    TSHELE 1.24 11/26/2024     Questionnaires:   PHQ:      10/31/2024    11:02 AM   PHQ-9    Little interest or pleasure in doing things 0   Feeling down, depressed, or hopeless 0   PHQ-2 Score 0   PHQ-9 Total Score 0     GAD7:       No data to display                Return to Clinic: 1 month OR sooner if needed    I have reviewed the SCRIPTS database report for this patient as required for prescription of controlled substances and did not note any discrepancies.  Medication side effects were discussed and benefits v. risks were presented. Treatment plan was discussed and agreed to by patient.  Diagnoses considered in this plan are \"working diagnoses\" and subject to change with additional information obtained over the course of additional time spent with patient and/or additional collateral information.  Currently, considering risks and protective factors, this patient has a:  low risk of suicide  low risk of homicide  Crisis Plan:  Patient encouraged to call 911 and /or go to the closest Emergency Department in case of agitation, severe anxiety, psychosis, angela, suicidal or homicidal urges, worrisome side effects to medications or other emergencies. Patient verbalized understanding of this plan and agreed to it.  This note has been completed using [employer] Dictation Software and while attempts have been made to ensure accuracy, certain words and phrases may not be transcribed as intended.

## 2024-12-27 ASSESSMENT — ANXIETY QUESTIONNAIRES
GAD7 TOTAL SCORE: 2
1. FEELING NERVOUS, ANXIOUS, OR ON EDGE: SEVERAL DAYS
4. TROUBLE RELAXING: SEVERAL DAYS
IF YOU CHECKED OFF ANY PROBLEMS ON THIS QUESTIONNAIRE, HOW DIFFICULT HAVE THESE PROBLEMS MADE IT FOR YOU TO DO YOUR WORK, TAKE CARE OF THINGS AT HOME, OR GET ALONG WITH OTHER PEOPLE: NOT DIFFICULT AT ALL
2. NOT BEING ABLE TO STOP OR CONTROL WORRYING: NOT AT ALL
6. BECOMING EASILY ANNOYED OR IRRITABLE: NOT AT ALL
IF YOU CHECKED OFF ANY PROBLEMS ON THIS QUESTIONNAIRE, HOW DIFFICULT HAVE THESE PROBLEMS MADE IT FOR YOU TO DO YOUR WORK, TAKE CARE OF THINGS AT HOME, OR GET ALONG WITH OTHER PEOPLE: NOT DIFFICULT AT ALL
7. FEELING AFRAID AS IF SOMETHING AWFUL MIGHT HAPPEN: NOT AT ALL
1. FEELING NERVOUS, ANXIOUS, OR ON EDGE: SEVERAL DAYS
3. WORRYING TOO MUCH ABOUT DIFFERENT THINGS: NOT AT ALL
3. WORRYING TOO MUCH ABOUT DIFFERENT THINGS: NOT AT ALL
6. BECOMING EASILY ANNOYED OR IRRITABLE: NOT AT ALL
4. TROUBLE RELAXING: SEVERAL DAYS
5. BEING SO RESTLESS THAT IT IS HARD TO SIT STILL: NOT AT ALL
7. FEELING AFRAID AS IF SOMETHING AWFUL MIGHT HAPPEN: NOT AT ALL
5. BEING SO RESTLESS THAT IT IS HARD TO SIT STILL: NOT AT ALL
2. NOT BEING ABLE TO STOP OR CONTROL WORRYING: NOT AT ALL

## 2024-12-27 ASSESSMENT — PATIENT HEALTH QUESTIONNAIRE - PHQ9
3. TROUBLE FALLING OR STAYING ASLEEP: SEVERAL DAYS
8. MOVING OR SPEAKING SO SLOWLY THAT OTHER PEOPLE COULD HAVE NOTICED. OR THE OPPOSITE - BEING SO FIDGETY OR RESTLESS THAT YOU HAVE BEEN MOVING AROUND A LOT MORE THAN USUAL: NOT AT ALL
SUM OF ALL RESPONSES TO PHQ QUESTIONS 1-9: 4
1. LITTLE INTEREST OR PLEASURE IN DOING THINGS: NOT AT ALL
SUM OF ALL RESPONSES TO PHQ QUESTIONS 1-9: 4
7. TROUBLE CONCENTRATING ON THINGS, SUCH AS READING THE NEWSPAPER OR WATCHING TELEVISION: SEVERAL DAYS
3. TROUBLE FALLING OR STAYING ASLEEP: SEVERAL DAYS
9. THOUGHTS THAT YOU WOULD BE BETTER OFF DEAD, OR OF HURTING YOURSELF: NOT AT ALL
4. FEELING TIRED OR HAVING LITTLE ENERGY: SEVERAL DAYS
10. IF YOU CHECKED OFF ANY PROBLEMS, HOW DIFFICULT HAVE THESE PROBLEMS MADE IT FOR YOU TO DO YOUR WORK, TAKE CARE OF THINGS AT HOME, OR GET ALONG WITH OTHER PEOPLE: NOT DIFFICULT AT ALL
6. FEELING BAD ABOUT YOURSELF - OR THAT YOU ARE A FAILURE OR HAVE LET YOURSELF OR YOUR FAMILY DOWN: SEVERAL DAYS
8. MOVING OR SPEAKING SO SLOWLY THAT OTHER PEOPLE COULD HAVE NOTICED. OR THE OPPOSITE, BEING SO FIGETY OR RESTLESS THAT YOU HAVE BEEN MOVING AROUND A LOT MORE THAN USUAL: NOT AT ALL
1. LITTLE INTEREST OR PLEASURE IN DOING THINGS: NOT AT ALL
9. THOUGHTS THAT YOU WOULD BE BETTER OFF DEAD, OR OF HURTING YOURSELF: NOT AT ALL
SUM OF ALL RESPONSES TO PHQ QUESTIONS 1-9: 4
4. FEELING TIRED OR HAVING LITTLE ENERGY: SEVERAL DAYS
2. FEELING DOWN, DEPRESSED OR HOPELESS: NOT AT ALL
5. POOR APPETITE OR OVEREATING: NOT AT ALL
5. POOR APPETITE OR OVEREATING: NOT AT ALL
2. FEELING DOWN, DEPRESSED OR HOPELESS: NOT AT ALL
SUM OF ALL RESPONSES TO PHQ9 QUESTIONS 1 & 2: 0
6. FEELING BAD ABOUT YOURSELF - OR THAT YOU ARE A FAILURE OR HAVE LET YOURSELF OR YOUR FAMILY DOWN: SEVERAL DAYS
SUM OF ALL RESPONSES TO PHQ QUESTIONS 1-9: 4
7. TROUBLE CONCENTRATING ON THINGS, SUCH AS READING THE NEWSPAPER OR WATCHING TELEVISION: SEVERAL DAYS
SUM OF ALL RESPONSES TO PHQ QUESTIONS 1-9: 4
10. IF YOU CHECKED OFF ANY PROBLEMS, HOW DIFFICULT HAVE THESE PROBLEMS MADE IT FOR YOU TO DO YOUR WORK, TAKE CARE OF THINGS AT HOME, OR GET ALONG WITH OTHER PEOPLE: NOT DIFFICULT AT ALL

## 2024-12-30 ENCOUNTER — TELEMEDICINE (OUTPATIENT)
Dept: BEHAVIORAL/MENTAL HEALTH CLINIC | Facility: CLINIC | Age: 79
End: 2024-12-30
Payer: MEDICARE

## 2024-12-30 DIAGNOSIS — F41.9 ANXIETY DISORDER, UNSPECIFIED TYPE: ICD-10-CM

## 2024-12-30 DIAGNOSIS — G47.00 INSOMNIA, UNSPECIFIED TYPE: ICD-10-CM

## 2024-12-30 DIAGNOSIS — F33.1 MAJOR DEPRESSIVE DISORDER, RECURRENT, MODERATE (HCC): Primary | ICD-10-CM

## 2024-12-30 DIAGNOSIS — F03.A3 MILD DEMENTIA WITH MOOD DISTURBANCE, UNSPECIFIED DEMENTIA TYPE (HCC): ICD-10-CM

## 2024-12-30 PROCEDURE — 99214 OFFICE O/P EST MOD 30 MIN: CPT | Performed by: PSYCHIATRY & NEUROLOGY

## 2024-12-30 PROCEDURE — 1159F MED LIST DOCD IN RCRD: CPT | Performed by: PSYCHIATRY & NEUROLOGY

## 2024-12-30 PROCEDURE — 1123F ACP DISCUSS/DSCN MKR DOCD: CPT | Performed by: PSYCHIATRY & NEUROLOGY

## 2024-12-30 PROCEDURE — 90833 PSYTX W PT W E/M 30 MIN: CPT | Performed by: PSYCHIATRY & NEUROLOGY

## 2024-12-30 PROCEDURE — 1160F RVW MEDS BY RX/DR IN RCRD: CPT | Performed by: PSYCHIATRY & NEUROLOGY

## 2024-12-30 PROCEDURE — 1090F PRES/ABSN URINE INCON ASSESS: CPT | Performed by: PSYCHIATRY & NEUROLOGY

## 2024-12-30 PROCEDURE — 1036F TOBACCO NON-USER: CPT | Performed by: PSYCHIATRY & NEUROLOGY

## 2024-12-30 PROCEDURE — G8400 PT W/DXA NO RESULTS DOC: HCPCS | Performed by: PSYCHIATRY & NEUROLOGY

## 2024-12-30 PROCEDURE — G8417 CALC BMI ABV UP PARAM F/U: HCPCS | Performed by: PSYCHIATRY & NEUROLOGY

## 2024-12-30 PROCEDURE — G8484 FLU IMMUNIZE NO ADMIN: HCPCS | Performed by: PSYCHIATRY & NEUROLOGY

## 2024-12-30 PROCEDURE — G8427 DOCREV CUR MEDS BY ELIG CLIN: HCPCS | Performed by: PSYCHIATRY & NEUROLOGY

## 2024-12-30 RX ORDER — VENLAFAXINE 75 MG/1
150 TABLET ORAL 2 TIMES DAILY
Qty: 120 TABLET | Refills: 1 | Status: SHIPPED | OUTPATIENT
Start: 2024-12-30

## 2024-12-30 RX ORDER — TRAZODONE HYDROCHLORIDE 50 MG/1
50 TABLET, FILM COATED ORAL NIGHTLY
Qty: 30 TABLET | Refills: 1 | Status: SHIPPED | OUTPATIENT
Start: 2024-12-30

## 2024-12-30 RX ORDER — TOPIRAMATE 25 MG/1
25 TABLET, FILM COATED ORAL DAILY
Qty: 30 TABLET | Refills: 1 | Status: SHIPPED | OUTPATIENT
Start: 2024-12-30

## 2024-12-30 NOTE — PROGRESS NOTES
"[facility]PARTMENT OF PSYCHIATRY   PATIENT INFORMATION    We appreciate the opportunity to participate in your medical care and hope the following protocols will make it easier for you to receive quality treatment in our department.    PUNCTUALITY: Your appointment is scheduled for a fixed amount of time, reserved especially for you.  To get the benefit of your appointment, please arrive at least 15 minutes early to allow time for traffic, parking and registration.  Should you arrive more than 15 minutes late to your appointment, you will be rescheduled in order to assure your clinician has adequate time to assess you and provide helpful care.      APPOINTMENTS: Appointments are made by the nursing/front office staff or through the patient portal. Providers do not have access  to schedule appointments. Walk in appointments are not available. FOR EMERGENCIES, PLEASE GO THE CLOSEST EMERGENCY ROOM.    CANCELLATION/MISSED APPOINTMENTS:   In order to receive quality care, all appointments must be kept.  If you are unable to keep an appointment, please reschedule at least 3 days prior if possible. Late cancellations (within 24 hours of the appointment) and repeated no-show appointments may result in dismissal from the clinic. After two no show/late cancellation visits, you will receive a notice letter, alerting you to keep visits to prevent department dismissal. If another visit is missed after receipt of the notice, you will be discharged from the clinic. This policy is in effect to allow for other individuals on a long waiting list to be seen as soon as possible. Unlike other branches of medicine where several individuals can be scheduled in a 30 minute time slot, only one individual can be scheduled in any time slot in Psychiatry.     MESSAGES: For simple questions/concerns, you may contact your individual providers electronically through the "[facility]" portal or by calling [phone] " with messages relayed via office staff. If relevant, include pharmacy name and phone number, date of last visit and next scheduled visit, phone number where you can be reached throughout the day, and whether leaving a voicemail or message on an answering machine is acceptable. Messages will be returned by the Medical Assistant or Office Staff after your provider has reviewed the message.  Please allow 24 hours for a returned voicemail message before leaving another voicemail message. Voicemail messages will be checked each workday (Monday through Friday) during office hours (8:00 a.m. and 5:00 p.m.) and returned at most within one business day.  You may leave a non-urgent message after hours. Note that psychotherapy and medication management are not appropriate by telephone or the patient portal. Portal messages may take up to 72 hours for a direct response from your provider.    PRESCRIPTION REFILLS:  Please communicate with your prescriber about any refills you need during your appointment. You may also request refills through the [facility] portal (preferred) or by calling the clinic. Prescriptions will be filled during office hours.     Please do not wait until you are completely out of medication to request refills. Same day refills are not always possible. Patients may experience symptoms of withdrawal if they run out of medications. The patient assumes all responsibility when there is an issue with non-compliance with follow-up appointments and medications.  Some medications are controlled and regulated by the FDA and [place]. Some of these medications can not be refilled before 30 days and require a face to face appointment.     PAPERWORK REQUESTS: If you have any forms or letters that need to be completed by your doctor, please present these at the beginning of the appointment to ensure that information needed to complete them is obtained during the office visit. Paperwork is completed during office visits  "only.    SPECIAL EVALUATIONS: Please note that our department is treatment-focused. As such, we focus on treatment-oriented evaluations and do not perform specialty or "forensic" evaluations. Examples are listed below.    Disability: We do not do disability evaluations.  Please contact Social Security Administration for evaluations and determinations. You will then sign releases allowing for records from your treatment providers to be forwarded to Social Security Administration to use in their evaluation.  Gun Permit: We do not offer Sound Judgment Evaluations or assessments leading to gun ownership, nor do we fill out or file paperwork relevant to owning, concealing or purchasing a firearm.  Emotional Support     Animals (PRAKASH): We do not provide documentation, including letters, to aid in the acclamation that an Emotional Support Animal is required. Note that ESAs are not trained to perform tasks or recognize particular signs or symptoms. Rather, they are distinguished by the close, emotional, and supportive bond between the animal and the owner.       SAMPLES: We do not provide samples of any medications. If you have financial difficulties and are on a limited income, you may qualify for Patient Assistance Programs from various pharmaceutical companies. This will require that you complete paperwork with your financial information, but this does not guarantee that the company will approve the application. Alternative medication options can be discussed. Some companies offer vouchers or coupons for discounts.    REFERRALS/COORDINATION: You will be referred to other providers if we feel unable to adequately diagnose or treat your particular condition, or if collaboration with another provider would allow for better management of your condition.       Call In if problems  Call Report Side Effects   Encouraged to follow up with primary care / Gen Med MD for continued monitoring of general health and wellness  Call " (EFFEXOR) 75 MG tablet Take 2 tablets by mouth 2 times daily 120 tablet 1    traZODone (DESYREL) 50 MG tablet Take 1 tablet by mouth nightly 30 tablet 1    topiramate (TOPAMAX) 25 MG tablet Take 1 tablet by mouth daily 30 tablet 1    apixaban (ELIQUIS) 5 MG TABS tablet Take 1 tablet by mouth 2 times daily 180 tablet 3    omeprazole (PRILOSEC) 40 MG delayed release capsule Take 1 capsule by mouth 2 times daily 180 capsule 3    metFORMIN (GLUCOPHAGE-XR) 500 MG extended release tablet Take 1 tablet by mouth daily (with breakfast) 90 tablet 1    donepezil (ARICEPT) 10 MG tablet Take 1 tablet by mouth nightly at bedtime 90 tablet 3    colchicine (COLCRYS) 0.6 MG tablet Take 1 tablet by mouth daily 30 tablet 3    mirabegron (MYRBETRIQ) 50 MG TB24 Take 50 mg by mouth daily 90 tablet 3    gabapentin (NEURONTIN) 250 MG/5ML solution Take 5 mLs by mouth 2 times daily for 90 days. 300 mL 2    torsemide (DEMADEX) 20 MG tablet Take 1 tablet by mouth daily 90 tablet 3    potassium chloride (K-TAB) 20 MEQ TBCR extended release tablet Take 1 tablet by mouth daily 90 tablet 3    metoprolol tartrate (LOPRESSOR) 25 MG tablet Take 0.5 tablets by mouth 2 times daily TK 1 T PO BID 90 tablet 3    nitrofurantoin (MACRODANTIN) 50 MG capsule TAKE 1 CAPSULE BY MOUTH DAILY 90 capsule 3    Oxygen Concentrator 2 L by Nasal route 2 L BLED IN OXYGEN      Misc. Devices (CPAP MACHINE) MISC 15 cm by Does not apply route      CALCIUM-VITAMIN D PO Take by mouth in the morning and at bedtime In morning and at noon      acetaminophen (TYLENOL) 500 MG tablet Take 2 tablets by mouth 2 times daily as needed      Cranberry 250 MG TABS Take by mouth in the morning and at bedtime      alendronate (FOSAMAX) 70 MG tablet Take 1 tablet by mouth every 7 days      Biotin 2.5 MG CAPS Take by mouth      cyanocobalamin 1000 MCG/ML injection INJECT 1 ML IN THE MUSCLE TWICE MONTHLY      folic acid (FOLVITE) 800 MCG tablet Take 1 tablet by mouth daily       No current  091 Or go to ER if Acute Concerns (especially if any thoughts of harm to self or other)          Jaycee Can, PhD, MP  Advanced Practice Medical Psychologist  [facility]  [address]  [place] [id number] [phone]   [phone] fax

## 2025-01-23 ENCOUNTER — TELEPHONE (OUTPATIENT)
Age: 80
End: 2025-01-23

## 2025-01-23 NOTE — TELEPHONE ENCOUNTER
Patient reports she had a high blood pressure today at PT. /110. Patient reports she had a headache at the time that she rated 5/10. She reports she took 1000mg tylenol. Headache pain now 2/10. Patient denies any cardiac or neurologic symptoms at this time. Had patient recheck her BP which is currently 114/67, HR 73. Patient instructed to monitor BP daily. Seek emergency care if needed.

## 2025-01-23 NOTE — TELEPHONE ENCOUNTER
Today PT took my blood pressure  and it was 180/110 with a headache and nausea.  It was also high on Tuesday when he took it. I took 2 extra strength tylenol at 2 pm and the head feels a little better. Was wondering if I should be concerned, or if there is anything you can do for me? Thanks,  Ela

## 2025-01-28 DIAGNOSIS — F41.9 ANXIETY DISORDER, UNSPECIFIED TYPE: ICD-10-CM

## 2025-01-28 DIAGNOSIS — G47.00 INSOMNIA, UNSPECIFIED TYPE: ICD-10-CM

## 2025-01-28 DIAGNOSIS — F33.1 MAJOR DEPRESSIVE DISORDER, RECURRENT, MODERATE (HCC): ICD-10-CM

## 2025-01-28 RX ORDER — TRAZODONE HYDROCHLORIDE 50 MG/1
50 TABLET ORAL NIGHTLY
Qty: 30 TABLET | Refills: 1 | OUTPATIENT
Start: 2025-01-28

## 2025-01-28 RX ORDER — VENLAFAXINE 75 MG/1
150 TABLET ORAL 2 TIMES DAILY
Qty: 120 TABLET | Refills: 1 | OUTPATIENT
Start: 2025-01-28

## 2025-02-12 ENCOUNTER — PATIENT MESSAGE (OUTPATIENT)
Dept: INTERNAL MEDICINE CLINIC | Facility: CLINIC | Age: 80
End: 2025-02-12

## 2025-02-12 ENCOUNTER — PATIENT MESSAGE (OUTPATIENT)
Dept: SLEEP MEDICINE | Age: 80
End: 2025-02-12

## 2025-02-13 ASSESSMENT — ENCOUNTER SYMPTOMS
ALLERGIC/IMMUNOLOGIC NEGATIVE: 1
RESPIRATORY NEGATIVE: 1
EYES NEGATIVE: 1
GASTROINTESTINAL NEGATIVE: 1

## 2025-02-13 NOTE — PROGRESS NOTES
[facility] Neurology [address], [address]  [place] [address]  [phone]      Chief Complaint   Patient presents with    Follow-up       Petra Head is a 79 y.o. female who presents for follow up for stroke.  Admission date: 10/23/24  Discharge date: 10/25/24     PMH significant for nonischemic cardiomyopathy with CHF preserved ejection fraction, A-fib on Xarelto, status post AV ablation, status post pacemaker, JEFF, prediabetes presented  to the ED on 10/23/2024 with  left sided sensory changes, left sided weakness, headache and dysarthria. Duration 15 minutes. Evaluated by Neurology.  CT head with low-attenuation changes likely representing chronic microvascular enteropathic ischemic changes with no acute intracranial hemorrhage or any other pathology. CTA of head/neck negative for LVO or high grade stenosis. MRI brain showed Small acute lacunar infarct of the right centrum semiovale. She was evaluated by therapies, recommendation for HH therapies. Xarelto was transitioned to Eliquis 5 mg twice daily for secondary stroke prevention. LDL at goal 69.     Interval history:    She is here today with her son.  Continues to endorse left-sided weakness and paresthesias in her left lower extremity worse than her right lower extremity.  She is currently participating in therapies. Ambulating with rollator. Denies falls, weakness, vision changes, speech or swallowing difficulties.   She is currently taking Eliquis 5 mg twice daily for secondary stroke prevention.  Tolerating medications without side effects.    Denies tobacco use, alcohol use or recreational drug use.    History of obstructive sleep apnea-followed by pulmonary and sleep medicine. Since previous visit, she was started on CPAP, however to use device due to it malfunctioning. She currently utilizes supplemental oxygen at night.    History of memory difficulties- stable. She is currently on donepezil 10 mg nightly.

## 2025-02-14 ENCOUNTER — PATIENT MESSAGE (OUTPATIENT)
Dept: INTERNAL MEDICINE CLINIC | Facility: CLINIC | Age: 80
End: 2025-02-14

## 2025-02-14 ENCOUNTER — OFFICE VISIT (OUTPATIENT)
Dept: NEUROLOGY | Age: 80
End: 2025-02-14
Payer: MEDICARE

## 2025-02-14 VITALS
HEART RATE: 88 BPM | BODY MASS INDEX: 38.58 KG/M2 | DIASTOLIC BLOOD PRESSURE: 86 MMHG | HEIGHT: 64 IN | WEIGHT: 226 LBS | OXYGEN SATURATION: 99 % | SYSTOLIC BLOOD PRESSURE: 139 MMHG

## 2025-02-14 DIAGNOSIS — G47.33 OBSTRUCTIVE SLEEP APNEA: ICD-10-CM

## 2025-02-14 DIAGNOSIS — I10 HTN, GOAL BELOW 130/80: ICD-10-CM

## 2025-02-14 DIAGNOSIS — I69.354 HEMIPARESIS AFFECTING LEFT SIDE AS LATE EFFECT OF CEREBROVASCULAR ACCIDENT (CVA) (HCC): ICD-10-CM

## 2025-02-14 DIAGNOSIS — I48.21 PERMANENT ATRIAL FIBRILLATION (HCC): ICD-10-CM

## 2025-02-14 DIAGNOSIS — I69.30 HISTORY OF STROKE WITH RESIDUAL DEFICIT: Primary | ICD-10-CM

## 2025-02-14 DIAGNOSIS — R41.3 MEMORY LOSS: ICD-10-CM

## 2025-02-14 DIAGNOSIS — E11.51 CONTROLLED TYPE 2 DM WITH PERIPHERAL CIRCULATORY DISORDER (HCC): ICD-10-CM

## 2025-02-14 PROCEDURE — 1159F MED LIST DOCD IN RCRD: CPT | Performed by: NURSE PRACTITIONER

## 2025-02-14 PROCEDURE — 3075F SYST BP GE 130 - 139MM HG: CPT | Performed by: NURSE PRACTITIONER

## 2025-02-14 PROCEDURE — 1123F ACP DISCUSS/DSCN MKR DOCD: CPT | Performed by: NURSE PRACTITIONER

## 2025-02-14 PROCEDURE — 3079F DIAST BP 80-89 MM HG: CPT | Performed by: NURSE PRACTITIONER

## 2025-02-14 PROCEDURE — G8400 PT W/DXA NO RESULTS DOC: HCPCS | Performed by: NURSE PRACTITIONER

## 2025-02-14 PROCEDURE — 1160F RVW MEDS BY RX/DR IN RCRD: CPT | Performed by: NURSE PRACTITIONER

## 2025-02-14 PROCEDURE — 99214 OFFICE O/P EST MOD 30 MIN: CPT | Performed by: NURSE PRACTITIONER

## 2025-02-14 PROCEDURE — 1036F TOBACCO NON-USER: CPT | Performed by: NURSE PRACTITIONER

## 2025-02-14 PROCEDURE — G8417 CALC BMI ABV UP PARAM F/U: HCPCS | Performed by: NURSE PRACTITIONER

## 2025-02-14 PROCEDURE — 1090F PRES/ABSN URINE INCON ASSESS: CPT | Performed by: NURSE PRACTITIONER

## 2025-02-14 PROCEDURE — 1126F AMNT PAIN NOTED NONE PRSNT: CPT | Performed by: NURSE PRACTITIONER

## 2025-02-14 PROCEDURE — G8427 DOCREV CUR MEDS BY ELIG CLIN: HCPCS | Performed by: NURSE PRACTITIONER

## 2025-02-14 ASSESSMENT — PATIENT HEALTH QUESTIONNAIRE - PHQ9
SUM OF ALL RESPONSES TO PHQ QUESTIONS 1-9: 0
1. LITTLE INTEREST OR PLEASURE IN DOING THINGS: NOT AT ALL
SUM OF ALL RESPONSES TO PHQ QUESTIONS 1-9: 0
SUM OF ALL RESPONSES TO PHQ9 QUESTIONS 1 & 2: 0
2. FEELING DOWN, DEPRESSED OR HOPELESS: NOT AT ALL

## 2025-02-14 NOTE — TELEPHONE ENCOUNTER
Pt is concern about feeling very sweaty with constant headaches. The NP from [facility] gave her a cream to use under breast with good relief and tylenol is temporary helping with headaches.    Please advise!     Patient is also requesting a prescription for a glucometor for blood sugar monitoring with supplies for it.   Thank you!

## 2025-02-17 NOTE — TELEPHONE ENCOUNTER
Scheduling ticket sent. Message from Dr PABLO below:    \"  She should come back in for follow up so we can go over her symptoms and recheck her HgbA1C.   \"

## 2025-02-20 ENCOUNTER — OFFICE VISIT (OUTPATIENT)
Dept: INTERNAL MEDICINE CLINIC | Facility: CLINIC | Age: 80
End: 2025-02-20
Payer: MEDICARE

## 2025-02-20 ENCOUNTER — LAB (OUTPATIENT)
Dept: INTERNAL MEDICINE CLINIC | Facility: CLINIC | Age: 80
End: 2025-02-20

## 2025-02-20 VITALS
WEIGHT: 224.6 LBS | HEIGHT: 64 IN | RESPIRATION RATE: 16 BRPM | TEMPERATURE: 98.2 F | HEART RATE: 73 BPM | BODY MASS INDEX: 38.35 KG/M2 | SYSTOLIC BLOOD PRESSURE: 120 MMHG | DIASTOLIC BLOOD PRESSURE: 60 MMHG

## 2025-02-20 DIAGNOSIS — N39.0 CHRONIC UTI: ICD-10-CM

## 2025-02-20 DIAGNOSIS — I48.21 PERMANENT ATRIAL FIBRILLATION (HCC): ICD-10-CM

## 2025-02-20 DIAGNOSIS — E11.59 TYPE 2 DIABETES MELLITUS WITH OTHER CIRCULATORY COMPLICATION, WITHOUT LONG-TERM CURRENT USE OF INSULIN (HCC): ICD-10-CM

## 2025-02-20 DIAGNOSIS — G47.33 OBSTRUCTIVE SLEEP APNEA: ICD-10-CM

## 2025-02-20 DIAGNOSIS — E11.9 TYPE 2 DIABETES MELLITUS WITHOUT COMPLICATION, WITHOUT LONG-TERM CURRENT USE OF INSULIN (HCC): Primary | ICD-10-CM

## 2025-02-20 DIAGNOSIS — E11.9 TYPE 2 DIABETES MELLITUS WITHOUT COMPLICATION, WITHOUT LONG-TERM CURRENT USE OF INSULIN (HCC): ICD-10-CM

## 2025-02-20 DIAGNOSIS — F32.A DEPRESSION, UNSPECIFIED DEPRESSION TYPE: ICD-10-CM

## 2025-02-20 DIAGNOSIS — K27.9 PUD (PEPTIC ULCER DISEASE): ICD-10-CM

## 2025-02-20 DIAGNOSIS — M81.0 OSTEOPOROSIS, UNSPECIFIED OSTEOPOROSIS TYPE, UNSPECIFIED PATHOLOGICAL FRACTURE PRESENCE: ICD-10-CM

## 2025-02-20 LAB
ALBUMIN SERPL-MCNC: 3.7 G/DL (ref 3.2–4.6)
ALBUMIN/GLOB SERPL: 1.1 (ref 1–1.9)
ALP SERPL-CCNC: 73 U/L (ref 35–104)
ALT SERPL-CCNC: 17 U/L (ref 8–45)
ANION GAP SERPL CALC-SCNC: 8 MMOL/L (ref 7–16)
AST SERPL-CCNC: 27 U/L (ref 15–37)
BASOPHILS # BLD: 0.04 K/UL (ref 0–0.2)
BASOPHILS NFR BLD: 0.5 % (ref 0–2)
BILIRUB SERPL-MCNC: 0.3 MG/DL (ref 0–1.2)
BUN SERPL-MCNC: 29 MG/DL (ref 8–23)
CALCIUM SERPL-MCNC: 10.4 MG/DL (ref 8.8–10.2)
CHLORIDE SERPL-SCNC: 105 MMOL/L (ref 98–107)
CHOLEST SERPL-MCNC: 116 MG/DL (ref 0–200)
CO2 SERPL-SCNC: 32 MMOL/L (ref 20–29)
CREAT SERPL-MCNC: 0.93 MG/DL (ref 0.6–1.1)
DIFFERENTIAL METHOD BLD: ABNORMAL
EOSINOPHIL # BLD: 0.1 K/UL (ref 0–0.8)
EOSINOPHIL NFR BLD: 1.3 % (ref 0.5–7.8)
ERYTHROCYTE [DISTWIDTH] IN BLOOD BY AUTOMATED COUNT: 14 % (ref 11.9–14.6)
EST. AVERAGE GLUCOSE BLD GHB EST-MCNC: 129 MG/DL
GLOBULIN SER CALC-MCNC: 3.3 G/DL (ref 2.3–3.5)
GLUCOSE SERPL-MCNC: 136 MG/DL (ref 70–99)
HBA1C MFR BLD: 6.1 % (ref 0–5.6)
HCT VFR BLD AUTO: 45.7 % (ref 35.8–46.3)
HDLC SERPL-MCNC: 30 MG/DL (ref 40–60)
HDLC SERPL: 3.9 (ref 0–5)
HGB BLD-MCNC: 14.7 G/DL (ref 11.7–15.4)
IMM GRANULOCYTES # BLD AUTO: 0.02 K/UL (ref 0–0.5)
IMM GRANULOCYTES NFR BLD AUTO: 0.3 % (ref 0–5)
LDLC SERPL CALC-MCNC: 63 MG/DL (ref 0–100)
LYMPHOCYTES # BLD: 2.08 K/UL (ref 0.5–4.6)
LYMPHOCYTES NFR BLD: 26 % (ref 13–44)
MCH RBC QN AUTO: 34.2 PG (ref 26.1–32.9)
MCHC RBC AUTO-ENTMCNC: 32.2 G/DL (ref 31.4–35)
MCV RBC AUTO: 106.3 FL (ref 82–102)
MONOCYTES # BLD: 0.58 K/UL (ref 0.1–1.3)
MONOCYTES NFR BLD: 7.3 % (ref 4–12)
NEUTS SEG # BLD: 5.17 K/UL (ref 1.7–8.2)
NEUTS SEG NFR BLD: 64.6 % (ref 43–78)
NRBC # BLD: 0 K/UL (ref 0–0.2)
PLATELET # BLD AUTO: 261 K/UL (ref 150–450)
PMV BLD AUTO: 10.2 FL (ref 9.4–12.3)
POTASSIUM SERPL-SCNC: 5.5 MMOL/L (ref 3.5–5.1)
PROT SERPL-MCNC: 7 G/DL (ref 6.3–8.2)
RBC # BLD AUTO: 4.3 M/UL (ref 4.05–5.2)
SODIUM SERPL-SCNC: 144 MMOL/L (ref 136–145)
TRIGL SERPL-MCNC: 114 MG/DL (ref 0–150)
VLDLC SERPL CALC-MCNC: 23 MG/DL (ref 6–23)
WBC # BLD AUTO: 8 K/UL (ref 4.3–11.1)

## 2025-02-20 PROCEDURE — 1159F MED LIST DOCD IN RCRD: CPT | Performed by: INTERNAL MEDICINE

## 2025-02-20 PROCEDURE — G8400 PT W/DXA NO RESULTS DOC: HCPCS | Performed by: INTERNAL MEDICINE

## 2025-02-20 PROCEDURE — 1036F TOBACCO NON-USER: CPT | Performed by: INTERNAL MEDICINE

## 2025-02-20 PROCEDURE — 1090F PRES/ABSN URINE INCON ASSESS: CPT | Performed by: INTERNAL MEDICINE

## 2025-02-20 PROCEDURE — G8417 CALC BMI ABV UP PARAM F/U: HCPCS | Performed by: INTERNAL MEDICINE

## 2025-02-20 PROCEDURE — G2211 COMPLEX E/M VISIT ADD ON: HCPCS | Performed by: INTERNAL MEDICINE

## 2025-02-20 PROCEDURE — 1123F ACP DISCUSS/DSCN MKR DOCD: CPT | Performed by: INTERNAL MEDICINE

## 2025-02-20 PROCEDURE — 99214 OFFICE O/P EST MOD 30 MIN: CPT | Performed by: INTERNAL MEDICINE

## 2025-02-20 PROCEDURE — G8427 DOCREV CUR MEDS BY ELIG CLIN: HCPCS | Performed by: INTERNAL MEDICINE

## 2025-02-20 RX ORDER — ACYCLOVIR 400 MG/1
TABLET ORAL
Qty: 3 EACH | Refills: 2 | Status: SHIPPED | OUTPATIENT
Start: 2025-02-20

## 2025-02-20 NOTE — TELEPHONE ENCOUNTER
I think the pt gets supplies through [employer]. Can you see if [facility] could get the pt a new machine? She had a recent stroke and needs to be on PAP therapy.     Thank you.

## 2025-02-20 NOTE — PROGRESS NOTES
[facility]  [address] [place] [address]  Phone: [phone]  Fax: [phone]      Problem Based Overview with Integrated Assessment and Plan    Petra Head is a 79 y.o. year old female who presents  for follow-up, reporting sweating and headaches for the past three weeks. She expresses concern about these symptoms and believes they may be related to her diabetes. The sweating occurs both during the day and at night, making sleep difficult. She mentions that eating something sugary, like hard candy, seems to alleviate the sweating.    Petra reports being careful about her diet and is receiving assistance from home health care. She reminds the doctor about her history of gastric bypass surgery and mentions concerns about the extended-release metformin she was prescribed, as it may not work effectively with her altered digestive system. Her last A1C was 6.8, and she's unsure if she's getting the right amount of metformin.    She also reports a recent episode of a heat rash and yeast infection under her breasts due to excessive sweating, which was treated with an ointment prescribed by a palliative care nurse practitioner.    Past Medical History:  Type 2 Diabetes Mellitus  Gastric bypass surgery (2018, redone due to ulcer)  Stroke (affecting emotional part of brain)  Congestive heart failure  Atrial fibrillation  Osteoporosis  Gout  Dementia  Depression  Overactive bladder  Surgical History:  Gastric bypass surgery (initial and revision in 2018)  Medications:  Metformin 500mg extended-release daily  Fosamax (for osteoporosis)  Eliquis (for atrial fibrillation)  Colchicine (for gout)  B12 injections  Donepezil (Aricept, for dementia)  Gabapentin  Trazodone (for sleep)  Nitrofurantoin (for UTIs)  Topamax (for OCD)  Torsemide (as needed for fluid retention)  Effexor (for depression)  Social History:  Uses oxygen at night (2%)  Uses CPAP (currently not working,

## 2025-02-20 NOTE — ASSESSMENT & PLAN NOTE
Chronic, not at goal (unstable), continue current treatment plan   Bladder \"Diet\": The following foods and beverages are irritating to your bladder and may aggravate incontinence or urine leakage. We recommend trying to remove these from your diet for two weeks and then re-introduce them one-by-one to see if you can identify potential irritants and work on removing them from your regular diet.     Liquids: citrus juices, coffee, tea, beer, wine, alcoholic beverages, carbonated beverages (regular and decaf), soft drinks with caffeine, milk and milk products, medication with caffeine    Foods: Citrus fruits, Chocolate, Chili or spicy foods, patiño powder, highly spiced foods, hot peppers, tomatoes or tomato based foods, sugar, artificial sweeteners, honey, corn syrup.     Cigarette Smoking is also irritating to the bladder.     Liquid Intake: Plain water is best to drink. Grape, cranberry, cherry, and apple juice are also good to drink, as they do not irritate the bladder. Kava and postern are okay alternative to coffee. You should drink 6-8 glasses of non-carbonated water per day. Drinking less liquid results in more concentrated urine which is more irritating to the bladder, encourages growth of bacteria, and leads to constipation.

## 2025-02-22 ASSESSMENT — PATIENT HEALTH QUESTIONNAIRE - PHQ9
10. IF YOU CHECKED OFF ANY PROBLEMS, HOW DIFFICULT HAVE THESE PROBLEMS MADE IT FOR YOU TO DO YOUR WORK, TAKE CARE OF THINGS AT HOME, OR GET ALONG WITH OTHER PEOPLE: NOT DIFFICULT AT ALL
SUM OF ALL RESPONSES TO PHQ QUESTIONS 1-9: 3
SUM OF ALL RESPONSES TO PHQ9 QUESTIONS 1 & 2: 0
4. FEELING TIRED OR HAVING LITTLE ENERGY: SEVERAL DAYS
8. MOVING OR SPEAKING SO SLOWLY THAT OTHER PEOPLE COULD HAVE NOTICED. OR THE OPPOSITE - BEING SO FIDGETY OR RESTLESS THAT YOU HAVE BEEN MOVING AROUND A LOT MORE THAN USUAL: NOT AT ALL
10. IF YOU CHECKED OFF ANY PROBLEMS, HOW DIFFICULT HAVE THESE PROBLEMS MADE IT FOR YOU TO DO YOUR WORK, TAKE CARE OF THINGS AT HOME, OR GET ALONG WITH OTHER PEOPLE: NOT DIFFICULT AT ALL
9. THOUGHTS THAT YOU WOULD BE BETTER OFF DEAD, OR OF HURTING YOURSELF: NOT AT ALL
3. TROUBLE FALLING OR STAYING ASLEEP: NOT AT ALL
9. THOUGHTS THAT YOU WOULD BE BETTER OFF DEAD, OR OF HURTING YOURSELF: NOT AT ALL
7. TROUBLE CONCENTRATING ON THINGS, SUCH AS READING THE NEWSPAPER OR WATCHING TELEVISION: SEVERAL DAYS
8. MOVING OR SPEAKING SO SLOWLY THAT OTHER PEOPLE COULD HAVE NOTICED. OR THE OPPOSITE, BEING SO FIGETY OR RESTLESS THAT YOU HAVE BEEN MOVING AROUND A LOT MORE THAN USUAL: NOT AT ALL
SUM OF ALL RESPONSES TO PHQ QUESTIONS 1-9: 3
SUM OF ALL RESPONSES TO PHQ QUESTIONS 1-9: 3
2. FEELING DOWN, DEPRESSED OR HOPELESS: NOT AT ALL
2. FEELING DOWN, DEPRESSED OR HOPELESS: NOT AT ALL
4. FEELING TIRED OR HAVING LITTLE ENERGY: SEVERAL DAYS
1. LITTLE INTEREST OR PLEASURE IN DOING THINGS: NOT AT ALL
SUM OF ALL RESPONSES TO PHQ QUESTIONS 1-9: 3
7. TROUBLE CONCENTRATING ON THINGS, SUCH AS READING THE NEWSPAPER OR WATCHING TELEVISION: SEVERAL DAYS
6. FEELING BAD ABOUT YOURSELF - OR THAT YOU ARE A FAILURE OR HAVE LET YOURSELF OR YOUR FAMILY DOWN: SEVERAL DAYS
5. POOR APPETITE OR OVEREATING: NOT AT ALL
5. POOR APPETITE OR OVEREATING: NOT AT ALL
1. LITTLE INTEREST OR PLEASURE IN DOING THINGS: NOT AT ALL
3. TROUBLE FALLING OR STAYING ASLEEP: NOT AT ALL
SUM OF ALL RESPONSES TO PHQ QUESTIONS 1-9: 3
6. FEELING BAD ABOUT YOURSELF - OR THAT YOU ARE A FAILURE OR HAVE LET YOURSELF OR YOUR FAMILY DOWN: SEVERAL DAYS

## 2025-02-22 ASSESSMENT — ANXIETY QUESTIONNAIRES
5. BEING SO RESTLESS THAT IT IS HARD TO SIT STILL: NOT AT ALL
2. NOT BEING ABLE TO STOP OR CONTROL WORRYING: SEVERAL DAYS
4. TROUBLE RELAXING: SEVERAL DAYS
7. FEELING AFRAID AS IF SOMETHING AWFUL MIGHT HAPPEN: NOT AT ALL
7. FEELING AFRAID AS IF SOMETHING AWFUL MIGHT HAPPEN: NOT AT ALL
IF YOU CHECKED OFF ANY PROBLEMS ON THIS QUESTIONNAIRE, HOW DIFFICULT HAVE THESE PROBLEMS MADE IT FOR YOU TO DO YOUR WORK, TAKE CARE OF THINGS AT HOME, OR GET ALONG WITH OTHER PEOPLE: NOT DIFFICULT AT ALL
1. FEELING NERVOUS, ANXIOUS, OR ON EDGE: MORE THAN HALF THE DAYS
6. BECOMING EASILY ANNOYED OR IRRITABLE: NOT AT ALL
4. TROUBLE RELAXING: SEVERAL DAYS
1. FEELING NERVOUS, ANXIOUS, OR ON EDGE: MORE THAN HALF THE DAYS
5. BEING SO RESTLESS THAT IT IS HARD TO SIT STILL: NOT AT ALL
2. NOT BEING ABLE TO STOP OR CONTROL WORRYING: SEVERAL DAYS
GAD7 TOTAL SCORE: 5
6. BECOMING EASILY ANNOYED OR IRRITABLE: NOT AT ALL
IF YOU CHECKED OFF ANY PROBLEMS ON THIS QUESTIONNAIRE, HOW DIFFICULT HAVE THESE PROBLEMS MADE IT FOR YOU TO DO YOUR WORK, TAKE CARE OF THINGS AT HOME, OR GET ALONG WITH OTHER PEOPLE: NOT DIFFICULT AT ALL
3. WORRYING TOO MUCH ABOUT DIFFERENT THINGS: SEVERAL DAYS
3. WORRYING TOO MUCH ABOUT DIFFERENT THINGS: SEVERAL DAYS

## 2025-02-24 ENCOUNTER — PATIENT MESSAGE (OUTPATIENT)
Dept: INTERNAL MEDICINE CLINIC | Facility: CLINIC | Age: 80
End: 2025-02-24

## 2025-02-25 ENCOUNTER — TELEMEDICINE (OUTPATIENT)
Dept: BEHAVIORAL/MENTAL HEALTH CLINIC | Facility: CLINIC | Age: 80
End: 2025-02-25
Payer: MEDICARE

## 2025-02-25 DIAGNOSIS — G47.00 INSOMNIA, UNSPECIFIED TYPE: ICD-10-CM

## 2025-02-25 DIAGNOSIS — F33.1 MAJOR DEPRESSIVE DISORDER, RECURRENT, MODERATE (HCC): Primary | ICD-10-CM

## 2025-02-25 DIAGNOSIS — F41.9 ANXIETY DISORDER, UNSPECIFIED TYPE: ICD-10-CM

## 2025-02-25 PROCEDURE — 90833 PSYTX W PT W E/M 30 MIN: CPT | Performed by: PSYCHIATRY & NEUROLOGY

## 2025-02-25 PROCEDURE — 1160F RVW MEDS BY RX/DR IN RCRD: CPT | Performed by: PSYCHIATRY & NEUROLOGY

## 2025-02-25 PROCEDURE — 1159F MED LIST DOCD IN RCRD: CPT | Performed by: PSYCHIATRY & NEUROLOGY

## 2025-02-25 PROCEDURE — 1123F ACP DISCUSS/DSCN MKR DOCD: CPT | Performed by: PSYCHIATRY & NEUROLOGY

## 2025-02-25 PROCEDURE — 99214 OFFICE O/P EST MOD 30 MIN: CPT | Performed by: PSYCHIATRY & NEUROLOGY

## 2025-02-25 PROCEDURE — 1036F TOBACCO NON-USER: CPT | Performed by: PSYCHIATRY & NEUROLOGY

## 2025-02-25 PROCEDURE — 1090F PRES/ABSN URINE INCON ASSESS: CPT | Performed by: PSYCHIATRY & NEUROLOGY

## 2025-02-25 PROCEDURE — G8400 PT W/DXA NO RESULTS DOC: HCPCS | Performed by: PSYCHIATRY & NEUROLOGY

## 2025-02-25 PROCEDURE — G8417 CALC BMI ABV UP PARAM F/U: HCPCS | Performed by: PSYCHIATRY & NEUROLOGY

## 2025-02-25 PROCEDURE — G8427 DOCREV CUR MEDS BY ELIG CLIN: HCPCS | Performed by: PSYCHIATRY & NEUROLOGY

## 2025-02-25 RX ORDER — TOPIRAMATE 25 MG/1
25 TABLET, FILM COATED ORAL DAILY
Qty: 30 TABLET | Refills: 2 | Status: SHIPPED | OUTPATIENT
Start: 2025-02-25

## 2025-02-25 RX ORDER — TRAZODONE HYDROCHLORIDE 50 MG/1
50 TABLET ORAL NIGHTLY
Qty: 30 TABLET | Refills: 2 | Status: SHIPPED | OUTPATIENT
Start: 2025-02-25

## 2025-02-25 RX ORDER — VENLAFAXINE 75 MG/1
150 TABLET ORAL 2 TIMES DAILY
Qty: 120 TABLET | Refills: 2 | Status: SHIPPED | OUTPATIENT
Start: 2025-02-25
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
## 2025-02-27 ENCOUNTER — PATIENT MESSAGE (OUTPATIENT)
Dept: INTERNAL MEDICINE CLINIC | Facility: CLINIC | Age: 80
End: 2025-02-27

## 2025-03-14 DIAGNOSIS — M79.672 BILATERAL FOOT PAIN: ICD-10-CM

## 2025-03-14 DIAGNOSIS — M79.671 BILATERAL FOOT PAIN: ICD-10-CM

## 2025-03-14 RX ORDER — COLCHICINE 0.6 MG/1
0.6 TABLET ORAL DAILY
Qty: 90 TABLET | Refills: 3 | Status: SHIPPED | OUTPATIENT
Start: 2025-03-14

## 2025-03-14 NOTE — TELEPHONE ENCOUNTER
Requested Prescriptions     Pending Prescriptions Disp Refills    colchicine (COLCRYS) 0.6 MG tablet 30 tablet 3     Sig: Take 1 tablet by mouth daily        Last OV: 2/20/25  Next Appt: 5/27/25    Rx Pended.

## 2025-03-31 PROCEDURE — 93296 REM INTERROG EVL PM/IDS: CPT | Performed by: INTERNAL MEDICINE

## 2025-03-31 PROCEDURE — 93294 REM INTERROG EVL PM/LDLS PM: CPT | Performed by: INTERNAL MEDICINE

## 2025-04-10 NOTE — PROGRESS NOTES
Teeth grinding     Urinary incontinence 1952    Onging all my life. Seeing  Edilia Dey NP, [facility].    UTI (urinary tract infection)      Past Surgical History:   Procedure Laterality Date    APPENDECTOMY  17 years old    CARDIAC SURGERY  ? 2022    Pacemaker and ablation    CATARACT REMOVAL      DILATION AND CURETTAGE OF UTERUS      EYE SURGERY  ?    Cataracts, replaced both lens. Curently see [facility], Dr. Greene    GASTRECTOMY      GASTRIC BYPASS SURGERY      complicated by ulcer    HERNIA REPAIR      HX JOINT REPLACEMENT SURGERY Left     knee    HYSTERECTOMY (CERVIX STATUS UNKNOWN)      HYSTERECTOMY, VAGINAL  ?    JOINT REPLACEMENT  Early 2000    Lt knee    LAP,CHOLECYSTECTOMY      ORTHOPEDIC SURGERY      HI APPENDECTOMY      TONSILLECTOMY AND ADENOIDECTOMY      UPPER GASTROINTESTINAL ENDOSCOPY  ? 2019    Tried to expand opening between stomach and small intestine     Family History   Problem Relation Age of Onset    Stroke Mother     Depression Mother     Heart Disease Mother     Congenital Heart Defect Father     Heart Attack Father     Heart Disease Father     High Blood Pressure Father     Stroke Sister     Arrhythmia Sister     Diabetes Sister     Atrial Fibrillation Sister     Depression Sister     Early Death Sister         Stroke after shoulder surgery    Stroke Sister      Social History     Tobacco Use    Smoking status: Never    Smokeless tobacco: Never   Substance Use Topics    Alcohol use: Not Currently     Comment: rare       ROS:    Review of Systems   Constitutional: Positive for weight loss.   Cardiovascular:  Negative for chest pain.   Respiratory:  Negative for shortness of breath.           PHYSICAL EXAM:   /84   Pulse 64   Ht 1.626 m (5' 4\")   Wt 99.3 kg (219 lb)   BMI 37.59 kg/m²      Wt Readings from Last 3 Encounters:   04/11/25 99.3 kg (219 lb)   02/20/25 101.9 kg (224 lb 9.6 oz)   02/14/25 102.5 kg (226 lb)     BP Readings from Last 3 Encounters:   04/11/25 
Unable to Assess/No

## 2025-04-11 ENCOUNTER — CLINICAL SUPPORT (OUTPATIENT)
Age: 80
End: 2025-04-11

## 2025-04-11 ENCOUNTER — OFFICE VISIT (OUTPATIENT)
Age: 80
End: 2025-04-11
Payer: MEDICARE

## 2025-04-11 VITALS
WEIGHT: 219 LBS | DIASTOLIC BLOOD PRESSURE: 84 MMHG | HEART RATE: 64 BPM | HEIGHT: 64 IN | SYSTOLIC BLOOD PRESSURE: 108 MMHG | BODY MASS INDEX: 37.39 KG/M2

## 2025-04-11 DIAGNOSIS — Z98.890 HX OF ATRIOVENTRICULAR NODE ABLATION: ICD-10-CM

## 2025-04-11 DIAGNOSIS — G47.33 OBSTRUCTIVE SLEEP APNEA: ICD-10-CM

## 2025-04-11 DIAGNOSIS — I50.32 CHRONIC HEART FAILURE WITH PRESERVED EJECTION FRACTION (HFPEF) (HCC): ICD-10-CM

## 2025-04-11 DIAGNOSIS — I42.8 NONISCHEMIC CARDIOMYOPATHY (HCC): ICD-10-CM

## 2025-04-11 DIAGNOSIS — I48.21 PERMANENT ATRIAL FIBRILLATION (HCC): Primary | ICD-10-CM

## 2025-04-11 DIAGNOSIS — Z95.0 PACEMAKER: ICD-10-CM

## 2025-04-11 DIAGNOSIS — Z98.890 HX OF ATRIOVENTRICULAR NODE ABLATION: Primary | ICD-10-CM

## 2025-04-11 PROCEDURE — 1160F RVW MEDS BY RX/DR IN RCRD: CPT | Performed by: INTERNAL MEDICINE

## 2025-04-11 PROCEDURE — 1090F PRES/ABSN URINE INCON ASSESS: CPT | Performed by: INTERNAL MEDICINE

## 2025-04-11 PROCEDURE — 1036F TOBACCO NON-USER: CPT | Performed by: INTERNAL MEDICINE

## 2025-04-11 PROCEDURE — 1123F ACP DISCUSS/DSCN MKR DOCD: CPT | Performed by: INTERNAL MEDICINE

## 2025-04-11 PROCEDURE — G8427 DOCREV CUR MEDS BY ELIG CLIN: HCPCS | Performed by: INTERNAL MEDICINE

## 2025-04-11 PROCEDURE — G8417 CALC BMI ABV UP PARAM F/U: HCPCS | Performed by: INTERNAL MEDICINE

## 2025-04-11 PROCEDURE — 1159F MED LIST DOCD IN RCRD: CPT | Performed by: INTERNAL MEDICINE

## 2025-04-11 PROCEDURE — G8400 PT W/DXA NO RESULTS DOC: HCPCS | Performed by: INTERNAL MEDICINE

## 2025-04-11 PROCEDURE — 99214 OFFICE O/P EST MOD 30 MIN: CPT | Performed by: INTERNAL MEDICINE

## 2025-04-11 RX ORDER — LANOLIN ALCOHOL/MO/W.PET/CERES
400 CREAM (GRAM) TOPICAL DAILY
Qty: 90 TABLET | Refills: 3 | Status: SHIPPED | OUTPATIENT
Start: 2025-04-11

## 2025-04-11 RX ORDER — LANOLIN ALCOHOL/MO/W.PET/CERES
400 CREAM (GRAM) TOPICAL DAILY
COMMUNITY
End: 2025-04-11 | Stop reason: SDUPTHER

## 2025-04-11 ASSESSMENT — ENCOUNTER SYMPTOMS: SHORTNESS OF BREATH: 0

## 2025-04-23 DIAGNOSIS — R20.2 PARESTHESIA OF BOTH LOWER EXTREMITIES: ICD-10-CM

## 2025-04-23 NOTE — TELEPHONE ENCOUNTER
Refills have been requested for the following medications:  Med pending          gabapentin (NEURONTIN) 250 MG/5ML solution [Johana Boone, APRN]      Patient Comment: May I have a 90 day refill please?     Preferred pharmacy: [id number] [address] - P [phone] - F [phone]

## 2025-04-24 RX ORDER — GABAPENTIN 250 MG/5ML
250 SOLUTION ORAL 2 TIMES DAILY
Qty: 300 ML | Refills: 5 | Status: SHIPPED | OUTPATIENT
Start: 2025-04-24 | End: 2025-10-21

## 2025-05-18 ASSESSMENT — ANXIETY QUESTIONNAIRES
3. WORRYING TOO MUCH ABOUT DIFFERENT THINGS: MORE THAN HALF THE DAYS
1. FEELING NERVOUS, ANXIOUS, OR ON EDGE: MORE THAN HALF THE DAYS
7. FEELING AFRAID AS IF SOMETHING AWFUL MIGHT HAPPEN: SEVERAL DAYS
GAD7 TOTAL SCORE: 10
7. FEELING AFRAID AS IF SOMETHING AWFUL MIGHT HAPPEN: SEVERAL DAYS
5. BEING SO RESTLESS THAT IT IS HARD TO SIT STILL: NOT AT ALL
5. BEING SO RESTLESS THAT IT IS HARD TO SIT STILL: NOT AT ALL
3. WORRYING TOO MUCH ABOUT DIFFERENT THINGS: MORE THAN HALF THE DAYS
IF YOU CHECKED OFF ANY PROBLEMS ON THIS QUESTIONNAIRE, HOW DIFFICULT HAVE THESE PROBLEMS MADE IT FOR YOU TO DO YOUR WORK, TAKE CARE OF THINGS AT HOME, OR GET ALONG WITH OTHER PEOPLE: SOMEWHAT DIFFICULT
4. TROUBLE RELAXING: SEVERAL DAYS
2. NOT BEING ABLE TO STOP OR CONTROL WORRYING: NEARLY EVERY DAY
4. TROUBLE RELAXING: SEVERAL DAYS
6. BECOMING EASILY ANNOYED OR IRRITABLE: SEVERAL DAYS
6. BECOMING EASILY ANNOYED OR IRRITABLE: SEVERAL DAYS
1. FEELING NERVOUS, ANXIOUS, OR ON EDGE: MORE THAN HALF THE DAYS
IF YOU CHECKED OFF ANY PROBLEMS ON THIS QUESTIONNAIRE, HOW DIFFICULT HAVE THESE PROBLEMS MADE IT FOR YOU TO DO YOUR WORK, TAKE CARE OF THINGS AT HOME, OR GET ALONG WITH OTHER PEOPLE: SOMEWHAT DIFFICULT
2. NOT BEING ABLE TO STOP OR CONTROL WORRYING: NEARLY EVERY DAY

## 2025-05-18 ASSESSMENT — PATIENT HEALTH QUESTIONNAIRE - PHQ9
3. TROUBLE FALLING OR STAYING ASLEEP: MORE THAN HALF THE DAYS
SUM OF ALL RESPONSES TO PHQ QUESTIONS 1-9: 10
2. FEELING DOWN, DEPRESSED OR HOPELESS: SEVERAL DAYS
10. IF YOU CHECKED OFF ANY PROBLEMS, HOW DIFFICULT HAVE THESE PROBLEMS MADE IT FOR YOU TO DO YOUR WORK, TAKE CARE OF THINGS AT HOME, OR GET ALONG WITH OTHER PEOPLE: SOMEWHAT DIFFICULT
9. THOUGHTS THAT YOU WOULD BE BETTER OFF DEAD, OR OF HURTING YOURSELF: NOT AT ALL
5. POOR APPETITE OR OVEREATING: SEVERAL DAYS
SUM OF ALL RESPONSES TO PHQ QUESTIONS 1-9: 10
6. FEELING BAD ABOUT YOURSELF - OR THAT YOU ARE A FAILURE OR HAVE LET YOURSELF OR YOUR FAMILY DOWN: MORE THAN HALF THE DAYS
5. POOR APPETITE OR OVEREATING: SEVERAL DAYS
3. TROUBLE FALLING OR STAYING ASLEEP: MORE THAN HALF THE DAYS
1. LITTLE INTEREST OR PLEASURE IN DOING THINGS: SEVERAL DAYS
10. IF YOU CHECKED OFF ANY PROBLEMS, HOW DIFFICULT HAVE THESE PROBLEMS MADE IT FOR YOU TO DO YOUR WORK, TAKE CARE OF THINGS AT HOME, OR GET ALONG WITH OTHER PEOPLE: SOMEWHAT DIFFICULT
6. FEELING BAD ABOUT YOURSELF - OR THAT YOU ARE A FAILURE OR HAVE LET YOURSELF OR YOUR FAMILY DOWN: MORE THAN HALF THE DAYS
SUM OF ALL RESPONSES TO PHQ QUESTIONS 1-9: 10
7. TROUBLE CONCENTRATING ON THINGS, SUCH AS READING THE NEWSPAPER OR WATCHING TELEVISION: SEVERAL DAYS
SUM OF ALL RESPONSES TO PHQ QUESTIONS 1-9: 10
4. FEELING TIRED OR HAVING LITTLE ENERGY: SEVERAL DAYS
SUM OF ALL RESPONSES TO PHQ QUESTIONS 1-9: 10
4. FEELING TIRED OR HAVING LITTLE ENERGY: SEVERAL DAYS
2. FEELING DOWN, DEPRESSED OR HOPELESS: SEVERAL DAYS
8. MOVING OR SPEAKING SO SLOWLY THAT OTHER PEOPLE COULD HAVE NOTICED. OR THE OPPOSITE - BEING SO FIDGETY OR RESTLESS THAT YOU HAVE BEEN MOVING AROUND A LOT MORE THAN USUAL: SEVERAL DAYS
7. TROUBLE CONCENTRATING ON THINGS, SUCH AS READING THE NEWSPAPER OR WATCHING TELEVISION: SEVERAL DAYS
8. MOVING OR SPEAKING SO SLOWLY THAT OTHER PEOPLE COULD HAVE NOTICED. OR THE OPPOSITE, BEING SO FIGETY OR RESTLESS THAT YOU HAVE BEEN MOVING AROUND A LOT MORE THAN USUAL: SEVERAL DAYS
9. THOUGHTS THAT YOU WOULD BE BETTER OFF DEAD, OR OF HURTING YOURSELF: NOT AT ALL
1. LITTLE INTEREST OR PLEASURE IN DOING THINGS: SEVERAL DAYS

## 2025-05-21 ENCOUNTER — TELEMEDICINE (OUTPATIENT)
Dept: BEHAVIORAL/MENTAL HEALTH CLINIC | Facility: CLINIC | Age: 80
End: 2025-05-21

## 2025-05-21 DIAGNOSIS — G47.00 INSOMNIA, UNSPECIFIED TYPE: ICD-10-CM

## 2025-05-21 DIAGNOSIS — F33.1 MAJOR DEPRESSIVE DISORDER, RECURRENT, MODERATE (HCC): Primary | ICD-10-CM

## 2025-05-21 DIAGNOSIS — F41.9 ANXIETY DISORDER, UNSPECIFIED TYPE: ICD-10-CM

## 2025-05-21 RX ORDER — VENLAFAXINE 75 MG/1
150 TABLET ORAL 2 TIMES DAILY
Qty: 120 TABLET | Refills: 2 | Status: SHIPPED | OUTPATIENT
Start: 2025-05-21

## 2025-05-21 RX ORDER — TRAZODONE HYDROCHLORIDE 50 MG/1
50-100 TABLET ORAL NIGHTLY PRN
Qty: 60 TABLET | Refills: 2 | Status: SHIPPED | OUTPATIENT
Start: 2025-05-21

## 2025-05-21 RX ORDER — TOPIRAMATE 25 MG/1
25 TABLET, FILM COATED ORAL DAILY
Qty: 30 TABLET | Refills: 2 | Status: SHIPPED | OUTPATIENT
Start: 2025-05-21

## 2025-05-21 NOTE — PROGRESS NOTES
(FOLVITE) 800 MCG tablet Take 1 tablet by mouth daily       No current facility-administered medications for this visit.       Reviewed and updated this visit by provider:  Tobacco  Allergies  Meds  Problems  Med Hx  Surg Hx  Fam Hx       OBJECTIVE EXAMINATION  There were no vitals filed for this visit.  Physical Exam:  General Appearance: Alert, cooperative, in no acute distress, appears stated age  Head: Normocephalic, atraumatic  Eyes: conjunctiva clear, PERRL, EOMI  Ears: Normal structure, intact.  Lungs/Heart: No observed wheezing, respirations unlabored. Normal chest rise.  Musculoskeletal: Normal strength and range of motion. No abnormal movements.    MENTAL STATUS EXAM:  Orientation: Oriented to person, place, time? and situation  Appearance: Well groomed, good hygiene  Psychomotor: No slowing or agitation  Speech: Normal rate, rhythm, tone and quantity, no slurring  Mood: fair  Affect: congruent  Thought content: No hallucinations or delusions, paranoia  SI/plan: denies  HI/plan: denies  Thought process: Linear and goal directed  Insight/Judgement: fair / fair  Attention: intact  Memory: grossly impairment in some espinoza  Gait: wnl    ASSESSMENT/PLAN:  Petra Head is a 80 y.o. old female who has a psychiatric history of dementia, depression, anxiety who presented for evaluation. Hx of sleep apnea. Takes Aricept. MoCA 28/30 2/2025.    Diagnosis Orders   1. Major depressive disorder, recurrent, moderate (HCC)  venlafaxine (EFFEXOR) 75 MG tablet      2. Anxiety disorder, unspecified type  venlafaxine (EFFEXOR) 75 MG tablet    traZODone (DESYREL) 50 MG tablet      3. Insomnia, unspecified type  traZODone (DESYREL) 50 MG tablet      Psychotherapy:  Defer at this time.  Supportive therapy:  Noted psychological stressors are entailed above in interval history of note.  Techniques applied: genuineness, explanation, encouragement, advice, positive reframing, ego-lending  Goals: improved acceptance,

## 2025-05-28 ENCOUNTER — HOSPITAL ENCOUNTER (OUTPATIENT)
Dept: GENERAL RADIOLOGY | Age: 80
Discharge: HOME | End: 2025-05-30
Payer: MEDICARE

## 2025-05-28 DIAGNOSIS — M79.672 BILATERAL FOOT PAIN: ICD-10-CM

## 2025-05-28 DIAGNOSIS — E11.9 TYPE 2 DIABETES MELLITUS WITHOUT COMPLICATION, WITHOUT LONG-TERM CURRENT USE OF INSULIN (HCC): ICD-10-CM

## 2025-05-28 DIAGNOSIS — E87.5 HYPERKALEMIA: ICD-10-CM

## 2025-05-28 DIAGNOSIS — M79.671 BILATERAL FOOT PAIN: ICD-10-CM

## 2025-05-28 LAB
ANION GAP SERPL CALC-SCNC: 12 MMOL/L (ref 7–16)
BUN SERPL-MCNC: 23 MG/DL (ref 8–23)
CALCIUM SERPL-MCNC: 9.4 MG/DL (ref 8.8–10.2)
CHLORIDE SERPL-SCNC: 105 MMOL/L (ref 98–107)
CO2 SERPL-SCNC: 24 MMOL/L (ref 20–29)
CREAT SERPL-MCNC: 0.81 MG/DL (ref 0.6–1.1)
EST. AVERAGE GLUCOSE BLD GHB EST-MCNC: 134 MG/DL
GLUCOSE SERPL-MCNC: 93 MG/DL (ref 70–99)
HBA1C MFR BLD: 6.3 % (ref 0–5.6)
POTASSIUM SERPL-SCNC: 4.6 MMOL/L (ref 3.5–5.1)
SODIUM SERPL-SCNC: 141 MMOL/L (ref 136–145)

## 2025-05-28 PROCEDURE — 73630 X-RAY EXAM OF FOOT: CPT

## 2025-05-29 ENCOUNTER — RESULTS FOLLOW-UP (OUTPATIENT)
Dept: INTERNAL MEDICINE CLINIC | Facility: CLINIC | Age: 80
End: 2025-05-29

## 2025-06-02 ENCOUNTER — OFFICE VISIT (OUTPATIENT)
Dept: INTERNAL MEDICINE CLINIC | Facility: CLINIC | Age: 80
End: 2025-06-02
Payer: MEDICARE

## 2025-06-02 VITALS
HEART RATE: 79 BPM | BODY MASS INDEX: 36.95 KG/M2 | HEIGHT: 64 IN | WEIGHT: 216.4 LBS | TEMPERATURE: 98.4 F | DIASTOLIC BLOOD PRESSURE: 70 MMHG | SYSTOLIC BLOOD PRESSURE: 116 MMHG | OXYGEN SATURATION: 98 %

## 2025-06-02 DIAGNOSIS — R60.9 EDEMA, UNSPECIFIED TYPE: ICD-10-CM

## 2025-06-02 DIAGNOSIS — M1A.9XX0 CHRONIC GOUT INVOLVING TOE OF LEFT FOOT WITHOUT TOPHUS, UNSPECIFIED CAUSE: ICD-10-CM

## 2025-06-02 DIAGNOSIS — S81.811A LACERATION OF SKIN OF RIGHT LOWER LEG, INITIAL ENCOUNTER: ICD-10-CM

## 2025-06-02 DIAGNOSIS — I50.32 CHRONIC HEART FAILURE WITH PRESERVED EJECTION FRACTION (HFPEF) (HCC): ICD-10-CM

## 2025-06-02 DIAGNOSIS — M81.0 OSTEOPOROSIS, UNSPECIFIED OSTEOPOROSIS TYPE, UNSPECIFIED PATHOLOGICAL FRACTURE PRESENCE: Primary | ICD-10-CM

## 2025-06-02 DIAGNOSIS — M70.61 TROCHANTERIC BURSITIS OF RIGHT HIP: ICD-10-CM

## 2025-06-02 DIAGNOSIS — E53.8 VITAMIN B12 DEFICIENCY: ICD-10-CM

## 2025-06-02 DIAGNOSIS — E11.59 TYPE 2 DIABETES MELLITUS WITH OTHER CIRCULATORY COMPLICATION, WITHOUT LONG-TERM CURRENT USE OF INSULIN (HCC): ICD-10-CM

## 2025-06-02 DIAGNOSIS — I48.21 PERMANENT ATRIAL FIBRILLATION (HCC): ICD-10-CM

## 2025-06-02 PROCEDURE — 1090F PRES/ABSN URINE INCON ASSESS: CPT | Performed by: INTERNAL MEDICINE

## 2025-06-02 PROCEDURE — 99215 OFFICE O/P EST HI 40 MIN: CPT | Performed by: INTERNAL MEDICINE

## 2025-06-02 PROCEDURE — G8417 CALC BMI ABV UP PARAM F/U: HCPCS | Performed by: INTERNAL MEDICINE

## 2025-06-02 PROCEDURE — 3044F HG A1C LEVEL LT 7.0%: CPT | Performed by: INTERNAL MEDICINE

## 2025-06-02 PROCEDURE — G8427 DOCREV CUR MEDS BY ELIG CLIN: HCPCS | Performed by: INTERNAL MEDICINE

## 2025-06-02 PROCEDURE — 1123F ACP DISCUSS/DSCN MKR DOCD: CPT | Performed by: INTERNAL MEDICINE

## 2025-06-02 PROCEDURE — 1159F MED LIST DOCD IN RCRD: CPT | Performed by: INTERNAL MEDICINE

## 2025-06-02 PROCEDURE — G2211 COMPLEX E/M VISIT ADD ON: HCPCS | Performed by: INTERNAL MEDICINE

## 2025-06-02 PROCEDURE — G8400 PT W/DXA NO RESULTS DOC: HCPCS | Performed by: INTERNAL MEDICINE

## 2025-06-02 PROCEDURE — 1036F TOBACCO NON-USER: CPT | Performed by: INTERNAL MEDICINE

## 2025-06-02 RX ORDER — ALENDRONATE SODIUM 70 MG/1
70 TABLET ORAL
Qty: 12 TABLET | Refills: 3 | Status: SHIPPED | OUTPATIENT
Start: 2025-06-02

## 2025-06-02 RX ORDER — TORSEMIDE 20 MG/1
20 TABLET ORAL DAILY
Qty: 7 TABLET | Refills: 0 | Status: SHIPPED | OUTPATIENT
Start: 2025-06-02

## 2025-06-02 NOTE — PROGRESS NOTES
[facility] [place] [address]  Phone: [phone]  Fax: [phone]      Problem Based Overview with Integrated Assessment and Plan      History of Present Illness  The patient presents for evaluation of a wound, diabetes, atrial fibrillation, sleep apnea, insomnia, memory issues, OCD, depression and anxiety, peripheral neuropathy, right hip pain, and left foot pain.    Wound  - Approximately 1.5 weeks ago, she experienced significant bleeding from an old scar after a shower, attributed to anticoagulant therapy.  - Despite the incident occurring over a week ago, she continues to experience pain and soreness.  - She applies Neosporin with a Q-tip and leaves the wound uncovered.  - Home health nurses provide wound management guidance.    Diabetes  - She is on metformin for diabetes and monitors her blood glucose with a glucometer, averaging 84 over the past month.  - She experienced hypoglycemia on 05/19/2025 with a glucose level of 54.    Atrial Fibrillation  - She takes metoprolol twice daily for heart failure and Eliquis for atrial fibrillation.  - She cannot take extended-release medications due to a previous stomach bypass surgery.  - Her pacemaker is functioning well.    Sleep Apnea  - She uses her CPAP machine for sleep apnea but struggles with insomnia, using 2 L of oxygen with CPAP.    Insomnia  - She takes trazodone for insomnia, which is helpful.    Memory Issues  - She is on donepezil for memory issues and reports improvement, engaging in more activities.    OCD  - She takes Topamax for OCD at night.    Depression and Anxiety  - She takes Effexor for depression and anxiety.    Peripheral Neuropathy  - She takes gabapentin for peripheral neuropathy and wears a brace for carpal tunnel syndrome, experiencing tingling in her hand.    Right Hip Pain  - She has right hip pain for about a month, with no known cause.  - She uses IcyHot and tramadol

## 2025-06-09 ENCOUNTER — OFFICE VISIT (OUTPATIENT)
Dept: UROLOGY | Age: 80
End: 2025-06-09
Payer: MEDICARE

## 2025-06-09 DIAGNOSIS — N39.0 RECURRENT UTI: Primary | ICD-10-CM

## 2025-06-09 DIAGNOSIS — N32.81 OVERACTIVE BLADDER: ICD-10-CM

## 2025-06-09 LAB
BILIRUBIN, URINE, POC: NEGATIVE
BLOOD URINE, POC: NORMAL
GLUCOSE URINE, POC: NEGATIVE MG/DL
KETONES, URINE, POC: NORMAL MG/DL
LEUKOCYTE ESTERASE, URINE, POC: NEGATIVE
NITRITE, URINE, POC: NEGATIVE
PH, URINE, POC: 6 (ref 4.6–8)
PROTEIN,URINE, POC: 30 MG/DL
SPECIFIC GRAVITY, URINE, POC: 1.02 (ref 1–1.03)
URINALYSIS CLARITY, POC: NORMAL
URINALYSIS COLOR, POC: NORMAL
UROBILINOGEN, POC: NORMAL MG/DL

## 2025-06-09 PROCEDURE — 1160F RVW MEDS BY RX/DR IN RCRD: CPT | Performed by: NURSE PRACTITIONER

## 2025-06-09 PROCEDURE — G8427 DOCREV CUR MEDS BY ELIG CLIN: HCPCS | Performed by: NURSE PRACTITIONER

## 2025-06-09 PROCEDURE — 81003 URINALYSIS AUTO W/O SCOPE: CPT | Performed by: NURSE PRACTITIONER

## 2025-06-09 PROCEDURE — 1159F MED LIST DOCD IN RCRD: CPT | Performed by: NURSE PRACTITIONER

## 2025-06-09 PROCEDURE — 1036F TOBACCO NON-USER: CPT | Performed by: NURSE PRACTITIONER

## 2025-06-09 PROCEDURE — 1123F ACP DISCUSS/DSCN MKR DOCD: CPT | Performed by: NURSE PRACTITIONER

## 2025-06-09 PROCEDURE — G8417 CALC BMI ABV UP PARAM F/U: HCPCS | Performed by: NURSE PRACTITIONER

## 2025-06-09 PROCEDURE — 1090F PRES/ABSN URINE INCON ASSESS: CPT | Performed by: NURSE PRACTITIONER

## 2025-06-09 PROCEDURE — 99214 OFFICE O/P EST MOD 30 MIN: CPT | Performed by: NURSE PRACTITIONER

## 2025-06-09 PROCEDURE — G8400 PT W/DXA NO RESULTS DOC: HCPCS | Performed by: NURSE PRACTITIONER

## 2025-06-09 RX ORDER — MIRABEGRON 50 MG/1
50 TABLET, FILM COATED, EXTENDED RELEASE ORAL DAILY
Qty: 90 TABLET | Refills: 3 | Status: SHIPPED | OUTPATIENT
Start: 2025-06-09

## 2025-06-09 RX ORDER — NITROFURANTOIN MACROCRYSTALS 50 MG/1
50 CAPSULE ORAL DAILY
Qty: 90 CAPSULE | Refills: 3 | Status: SHIPPED | OUTPATIENT
Start: 2025-06-09

## 2025-06-09 ASSESSMENT — ENCOUNTER SYMPTOMS: BACK PAIN: 0

## 2025-06-09 NOTE — PROGRESS NOTES
[facility]  [address]  [place] [address]  [phone]          Petra Head  : 1945    Chief Complaint   Patient presents with    Follow-up    Urinary Tract Infection          HPI     Petra Head is a 80 y.o. female with hx of Afib on xarelto, pacemaker, OAB, JEFF, and CHF on torsemide referred for recurrent E.Coli UTI.  Prev followed by [facility], and Dr. Haskins prior to that. Pt reports recurrent UTIs beginning after a hysterectomy and \"anal hernia repair\" in . Also had sling procedure with hysterectomy. Sx include frequency, urgency, burning with urination, gross hematuria, and back pain. Macrodantin suppression 50 mg started in . She has down well w this. Negative GIORGI in . Prev PVR 0 cc via u/s. Proteus UTI in Aug 23 while on suppression. Took 2 rounds of antibiotics to clear. Klebsiella UTI in Oct 23 tx w Bactrim. Cysto by Dr. Bravo in Oct 23 showed urethral stricture likely from prior sling. This was dilated up to 30 fr; 12 fr prior. Flow is much better after this. She cont on macrodantin suppression. Reports UTI tx in  by PC. Asx today.      Also has OAB. Currently taking myrbetriq 50 mg daily and states this works well for her. Continues to have occ UI, but this has greatly improved. Wears pads daily. CMG showed DI many yrs ago w Mariely Ma NP. Has documented grade 2 cystocele. Has tried pessary, but states this fell out after 3 attempts to adjust size. Not interested in surgery for this. Prescribed estrace cream, but has not been using this lately.      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 Having memory issues. Notes increased UU/UF after stroke.    Current w [facility].      Cr 0.81.           Past Medical History:   Diagnosis Date    Anticoagulant long-term use     Not sure, but when diagnosed with a-fib.    Anxiety 1980    Take 2 different meds, seeing a new psychiatrist starting

## 2025-06-23 ENCOUNTER — OFFICE VISIT (OUTPATIENT)
Dept: ORTHOPEDIC SURGERY | Age: 80
End: 2025-06-23
Payer: MEDICARE

## 2025-06-23 DIAGNOSIS — M79.18 BUTTOCK PAIN: ICD-10-CM

## 2025-06-23 DIAGNOSIS — M43.16 SPONDYLOLISTHESIS OF LUMBAR REGION: ICD-10-CM

## 2025-06-23 DIAGNOSIS — M70.61 TROCHANTERIC BURSITIS OF RIGHT HIP: ICD-10-CM

## 2025-06-23 DIAGNOSIS — M51.362 DEGENERATION OF INTERVERTEBRAL DISC OF LUMBAR REGION WITH DISCOGENIC BACK PAIN AND LOWER EXTREMITY PAIN: ICD-10-CM

## 2025-06-23 DIAGNOSIS — M47.816 LUMBAR SPONDYLOSIS: ICD-10-CM

## 2025-06-23 DIAGNOSIS — M25.551 RIGHT HIP PAIN: Primary | ICD-10-CM

## 2025-06-23 PROCEDURE — 1090F PRES/ABSN URINE INCON ASSESS: CPT | Performed by: PHYSICIAN ASSISTANT

## 2025-06-23 PROCEDURE — 1036F TOBACCO NON-USER: CPT | Performed by: PHYSICIAN ASSISTANT

## 2025-06-23 PROCEDURE — G8427 DOCREV CUR MEDS BY ELIG CLIN: HCPCS | Performed by: PHYSICIAN ASSISTANT

## 2025-06-23 PROCEDURE — 1160F RVW MEDS BY RX/DR IN RCRD: CPT | Performed by: PHYSICIAN ASSISTANT

## 2025-06-23 PROCEDURE — 1159F MED LIST DOCD IN RCRD: CPT | Performed by: PHYSICIAN ASSISTANT

## 2025-06-23 PROCEDURE — G8417 CALC BMI ABV UP PARAM F/U: HCPCS | Performed by: PHYSICIAN ASSISTANT

## 2025-06-23 PROCEDURE — G8400 PT W/DXA NO RESULTS DOC: HCPCS | Performed by: PHYSICIAN ASSISTANT

## 2025-06-23 PROCEDURE — 1123F ACP DISCUSS/DSCN MKR DOCD: CPT | Performed by: PHYSICIAN ASSISTANT

## 2025-06-23 PROCEDURE — 99204 OFFICE O/P NEW MOD 45 MIN: CPT | Performed by: PHYSICIAN ASSISTANT

## 2025-06-23 NOTE — PATIENT INSTRUCTIONS
Patient Education        Hip Bursitis: Care Instructions  Overview     Bursitis is inflammation of the bursa. A bursa is a small sac of fluid that cushions a joint and helps it move easily. A bursa sits between a bone in the hip and the muscles and tendons in the thigh and buttock. Injury or overuse of the hip can cause bursitis. Activities that can lead to bursitis include twisting and rapid joint movement. Bursitis can cause hip pain.  Bursitis usually gets better if you avoid the activity that caused it. If pain lasts or gets worse despite home treatment, your doctor may draw fluid from the bursa through a needle. This may relieve your pain and help your doctor know if you have an infection. If so, your doctor will prescribe antibiotics. If you have inflammation only, you may get a corticosteroid shot to reduce swelling and pain. Sometimes surgery is needed to drain or remove the bursa.  Follow-up care is a key part of your treatment and safety. Be sure to make and go to all appointments, and call your doctor if you are having problems. It's also a good idea to know your test results and keep a list of the medicines you take.  How can you care for yourself at home?  Put ice or a cold pack on your hip for 10 to 20 minutes at a time. Put a thin cloth between the ice and your skin.  After 3 days of using ice, you may use heat on your hip. You can use a hot water bottle, a heating pad set on low, or a warm, moist towel.  Rest your hip. Stop any activities that cause pain. Switch to activities that do not stress your hip.  Take your medicines exactly as prescribed. Call your doctor if you think you are having a problem with your medicine.  Ask your doctor if you can take an over-the-counter pain medicine, such as acetaminophen (Tylenol), ibuprofen (Advil, Motrin), or naproxen (Aleve). Be safe with medicines. Read and follow all instructions on the label.  To prevent stiffness, gently move the hip joint as much as you

## 2025-06-23 NOTE — PROGRESS NOTES
Name: Petra Head  YOB: 1945  Gender: female  MRN: [id number]    CC:   Chief Complaint   Patient presents with    Hip Pain     Rt hip          HPI: Petra Head is a 80 y.o. female with a  has a past medical history of Anticoagulant long-term use, Anxiety, Atrial fibrillation (HCC), Cerebrovascular disease, CHF (congestive heart failure) (HCC), Depression, Gastric ulcer, GERD (gastroesophageal reflux disease), Glaucoma, Headache, Kidney stone, Neuropathy, OAB (overactive bladder), Obesity, Sleep apnea, Tardive dyskinesia, Teeth grinding, Urinary incontinence, and UTI (urinary tract infection).here for evaluation of right hip pain  There was not an acute injury  Regarding the hip pain, it has been present for months and is becoming worse. The pain is primarily lateral, over greater trochanter and posterior buttock area  she describes the pain as aching and throbbing  The pain is worse with inactivity, at night, often waking them from sleep, and with the first few steps of walking and then improves  The pain does not radiate down the leg.  she denies numbness and tingling down the leg.   Treatment so far has been activity modification, Tylenol, ice, and icy hot with little relief.        Review of Systems  As per HPI.  Pertinent positives and negatives are addressed with the patient, particularly those related to musculoskeletal concerns.  Non-orthopaedic concerns were referred back to the primary care physician.      Allergies   Allergen Reactions    Bupropion Other (See Comments)    Erythromycin Other (See Comments)    Ibuprofen Other (See Comments)     Swelling    Levofloxacin Other (See Comments)    Clarithromycin Rash    Doxycycline Rash    Shrimp (Diagnostic) Dermatitis, Other (See Comments), Rash and Swelling    Talc Rash    Zinc Rash                    PHYSICAL EXAMINATION:   The patient is alert and oriented, in no distress.  There were no vitals filed for this visit.

## 2025-06-26 DIAGNOSIS — M47.816 LUMBAR SPONDYLOSIS: Primary | ICD-10-CM

## 2025-06-26 DIAGNOSIS — M51.362 DEGENERATION OF INTERVERTEBRAL DISC OF LUMBAR REGION WITH DISCOGENIC BACK PAIN AND LOWER EXTREMITY PAIN: ICD-10-CM

## 2025-06-26 DIAGNOSIS — M79.18 BUTTOCK PAIN: ICD-10-CM

## 2025-06-26 DIAGNOSIS — M25.551 RIGHT HIP PAIN: ICD-10-CM

## 2025-06-26 DIAGNOSIS — M70.61 TROCHANTERIC BURSITIS OF RIGHT HIP: ICD-10-CM

## 2025-06-26 DIAGNOSIS — M43.16 SPONDYLOLISTHESIS OF LUMBAR REGION: ICD-10-CM

## 2025-08-18 RX ORDER — TRAMADOL HYDROCHLORIDE 50 MG/1
50 TABLET ORAL 2 TIMES DAILY PRN
COMMUNITY
Start: 2025-06-06

## 2025-08-19 ENCOUNTER — TELEMEDICINE (OUTPATIENT)
Dept: BEHAVIORAL/MENTAL HEALTH CLINIC | Facility: CLINIC | Age: 80
End: 2025-08-19
Payer: MEDICARE

## 2025-08-19 DIAGNOSIS — F41.9 ANXIETY DISORDER, UNSPECIFIED TYPE: ICD-10-CM

## 2025-08-19 DIAGNOSIS — G47.00 INSOMNIA, UNSPECIFIED TYPE: ICD-10-CM

## 2025-08-19 DIAGNOSIS — F33.1 MAJOR DEPRESSIVE DISORDER, RECURRENT, MODERATE (HCC): Primary | ICD-10-CM

## 2025-08-19 PROCEDURE — 1123F ACP DISCUSS/DSCN MKR DOCD: CPT | Performed by: PSYCHIATRY & NEUROLOGY

## 2025-08-19 PROCEDURE — G8417 CALC BMI ABV UP PARAM F/U: HCPCS | Performed by: PSYCHIATRY & NEUROLOGY

## 2025-08-19 PROCEDURE — G8400 PT W/DXA NO RESULTS DOC: HCPCS | Performed by: PSYCHIATRY & NEUROLOGY

## 2025-08-19 PROCEDURE — 1036F TOBACCO NON-USER: CPT | Performed by: PSYCHIATRY & NEUROLOGY

## 2025-08-19 PROCEDURE — G8427 DOCREV CUR MEDS BY ELIG CLIN: HCPCS | Performed by: PSYCHIATRY & NEUROLOGY

## 2025-08-19 PROCEDURE — 1090F PRES/ABSN URINE INCON ASSESS: CPT | Performed by: PSYCHIATRY & NEUROLOGY

## 2025-08-19 PROCEDURE — 90833 PSYTX W PT W E/M 30 MIN: CPT | Performed by: PSYCHIATRY & NEUROLOGY

## 2025-08-19 PROCEDURE — 1160F RVW MEDS BY RX/DR IN RCRD: CPT | Performed by: PSYCHIATRY & NEUROLOGY

## 2025-08-19 PROCEDURE — 1159F MED LIST DOCD IN RCRD: CPT | Performed by: PSYCHIATRY & NEUROLOGY

## 2025-08-19 PROCEDURE — 99214 OFFICE O/P EST MOD 30 MIN: CPT | Performed by: PSYCHIATRY & NEUROLOGY

## 2025-08-19 RX ORDER — VENLAFAXINE 75 MG/1
150 TABLET ORAL 2 TIMES DAILY
Qty: 120 TABLET | Refills: 2 | Status: SHIPPED | OUTPATIENT
Start: 2025-08-19

## 2025-08-19 RX ORDER — TOPIRAMATE 25 MG/1
25 TABLET, FILM COATED ORAL DAILY
Qty: 90 TABLET | Refills: 0 | Status: SHIPPED | OUTPATIENT
Start: 2025-08-19

## 2025-08-19 RX ORDER — TRAZODONE HYDROCHLORIDE 50 MG/1
50 TABLET ORAL NIGHTLY PRN
Qty: 90 TABLET | Refills: 0 | Status: SHIPPED | OUTPATIENT
Start: 2025-08-19

## 2025-08-19 ASSESSMENT — PATIENT HEALTH QUESTIONNAIRE - PHQ9
6. FEELING BAD ABOUT YOURSELF - OR THAT YOU ARE A FAILURE OR HAVE LET YOURSELF OR YOUR FAMILY DOWN: SEVERAL DAYS
9. THOUGHTS THAT YOU WOULD BE BETTER OFF DEAD, OR OF HURTING YOURSELF: NOT AT ALL
1. LITTLE INTEREST OR PLEASURE IN DOING THINGS: NOT AT ALL
8. MOVING OR SPEAKING SO SLOWLY THAT OTHER PEOPLE COULD HAVE NOTICED. OR THE OPPOSITE - BEING SO FIDGETY OR RESTLESS THAT YOU HAVE BEEN MOVING AROUND A LOT MORE THAN USUAL: NOT AT ALL
10. IF YOU CHECKED OFF ANY PROBLEMS, HOW DIFFICULT HAVE THESE PROBLEMS MADE IT FOR YOU TO DO YOUR WORK, TAKE CARE OF THINGS AT HOME, OR GET ALONG WITH OTHER PEOPLE: SOMEWHAT DIFFICULT
1. LITTLE INTEREST OR PLEASURE IN DOING THINGS: NOT AT ALL
4. FEELING TIRED OR HAVING LITTLE ENERGY: SEVERAL DAYS
2. FEELING DOWN, DEPRESSED OR HOPELESS: NOT AT ALL
SUM OF ALL RESPONSES TO PHQ QUESTIONS 1-9: 4
3. TROUBLE FALLING OR STAYING ASLEEP: SEVERAL DAYS
2. FEELING DOWN, DEPRESSED OR HOPELESS: NOT AT ALL
SUM OF ALL RESPONSES TO PHQ QUESTIONS 1-9: 4
SUM OF ALL RESPONSES TO PHQ QUESTIONS 1-9: 4
7. TROUBLE CONCENTRATING ON THINGS, SUCH AS READING THE NEWSPAPER OR WATCHING TELEVISION: NOT AT ALL
4. FEELING TIRED OR HAVING LITTLE ENERGY: SEVERAL DAYS
5. POOR APPETITE OR OVEREATING: SEVERAL DAYS
7. TROUBLE CONCENTRATING ON THINGS, SUCH AS READING THE NEWSPAPER OR WATCHING TELEVISION: NOT AT ALL
3. TROUBLE FALLING OR STAYING ASLEEP: SEVERAL DAYS
10. IF YOU CHECKED OFF ANY PROBLEMS, HOW DIFFICULT HAVE THESE PROBLEMS MADE IT FOR YOU TO DO YOUR WORK, TAKE CARE OF THINGS AT HOME, OR GET ALONG WITH OTHER PEOPLE: SOMEWHAT DIFFICULT
SUM OF ALL RESPONSES TO PHQ QUESTIONS 1-9: 4
5. POOR APPETITE OR OVEREATING: SEVERAL DAYS
9. THOUGHTS THAT YOU WOULD BE BETTER OFF DEAD, OR OF HURTING YOURSELF: NOT AT ALL
8. MOVING OR SPEAKING SO SLOWLY THAT OTHER PEOPLE COULD HAVE NOTICED. OR THE OPPOSITE, BEING SO FIGETY OR RESTLESS THAT YOU HAVE BEEN MOVING AROUND A LOT MORE THAN USUAL: NOT AT ALL
6. FEELING BAD ABOUT YOURSELF - OR THAT YOU ARE A FAILURE OR HAVE LET YOURSELF OR YOUR FAMILY DOWN: SEVERAL DAYS
SUM OF ALL RESPONSES TO PHQ QUESTIONS 1-9: 4

## 2025-08-19 ASSESSMENT — ANXIETY QUESTIONNAIRES
3. WORRYING TOO MUCH ABOUT DIFFERENT THINGS: SEVERAL DAYS
GAD7 TOTAL SCORE: 7
2. NOT BEING ABLE TO STOP OR CONTROL WORRYING: SEVERAL DAYS
IF YOU CHECKED OFF ANY PROBLEMS ON THIS QUESTIONNAIRE, HOW DIFFICULT HAVE THESE PROBLEMS MADE IT FOR YOU TO DO YOUR WORK, TAKE CARE OF THINGS AT HOME, OR GET ALONG WITH OTHER PEOPLE: SOMEWHAT DIFFICULT
2. NOT BEING ABLE TO STOP OR CONTROL WORRYING: SEVERAL DAYS
5. BEING SO RESTLESS THAT IT IS HARD TO SIT STILL: SEVERAL DAYS
4. TROUBLE RELAXING: SEVERAL DAYS
4. TROUBLE RELAXING: SEVERAL DAYS
1. FEELING NERVOUS, ANXIOUS, OR ON EDGE: SEVERAL DAYS
6. BECOMING EASILY ANNOYED OR IRRITABLE: SEVERAL DAYS
5. BEING SO RESTLESS THAT IT IS HARD TO SIT STILL: SEVERAL DAYS
1. FEELING NERVOUS, ANXIOUS, OR ON EDGE: SEVERAL DAYS
7. FEELING AFRAID AS IF SOMETHING AWFUL MIGHT HAPPEN: SEVERAL DAYS
6. BECOMING EASILY ANNOYED OR IRRITABLE: SEVERAL DAYS
3. WORRYING TOO MUCH ABOUT DIFFERENT THINGS: SEVERAL DAYS
7. FEELING AFRAID AS IF SOMETHING AWFUL MIGHT HAPPEN: SEVERAL DAYS
IF YOU CHECKED OFF ANY PROBLEMS ON THIS QUESTIONNAIRE, HOW DIFFICULT HAVE THESE PROBLEMS MADE IT FOR YOU TO DO YOUR WORK, TAKE CARE OF THINGS AT HOME, OR GET ALONG WITH OTHER PEOPLE: SOMEWHAT DIFFICULT